# Patient Record
Sex: FEMALE | Race: WHITE | Employment: OTHER | URBAN - METROPOLITAN AREA
[De-identification: names, ages, dates, MRNs, and addresses within clinical notes are randomized per-mention and may not be internally consistent; named-entity substitution may affect disease eponyms.]

---

## 2017-03-20 ENCOUNTER — OFFICE VISIT (OUTPATIENT)
Dept: CARDIOLOGY CLINIC | Age: 77
End: 2017-03-20

## 2017-03-20 VITALS
SYSTOLIC BLOOD PRESSURE: 140 MMHG | DIASTOLIC BLOOD PRESSURE: 82 MMHG | HEIGHT: 62 IN | OXYGEN SATURATION: 96 % | WEIGHT: 162 LBS | BODY MASS INDEX: 29.81 KG/M2 | HEART RATE: 79 BPM

## 2017-03-20 DIAGNOSIS — I49.5 TACHYCARDIA-BRADYCARDIA SYNDROME (HCC): ICD-10-CM

## 2017-03-20 DIAGNOSIS — I48.19 PERSISTENT ATRIAL FIBRILLATION (HCC): Primary | ICD-10-CM

## 2017-03-20 DIAGNOSIS — Z95.0 PACEMAKER: ICD-10-CM

## 2017-03-20 DIAGNOSIS — I11.9 BENIGN HYPERTENSIVE HEART DISEASE WITHOUT HEART FAILURE: ICD-10-CM

## 2017-03-20 DIAGNOSIS — R00.2 PALPITATIONS: ICD-10-CM

## 2017-03-20 DIAGNOSIS — I34.1 MITRAL VALVE PROLAPSE SYNDROME: ICD-10-CM

## 2017-03-20 DIAGNOSIS — I25.10 ATHEROSCLEROSIS OF NATIVE CORONARY ARTERY OF NATIVE HEART WITHOUT ANGINA PECTORIS: ICD-10-CM

## 2017-03-20 DIAGNOSIS — H83.2X9 VESTIBULAR DYSFUNCTION, UNSPECIFIED LATERALITY: ICD-10-CM

## 2017-03-20 RX ORDER — LOSARTAN POTASSIUM AND HYDROCHLOROTHIAZIDE 12.5; 5 MG/1; MG/1
TABLET ORAL
Refills: 1 | COMMUNITY
Start: 2017-03-13 | End: 2018-08-07 | Stop reason: ALTCHOICE

## 2017-03-20 NOTE — PROGRESS NOTES
HISTORY OF PRESENT ILLNESS  Yazan Valentine is a 65 y.o. female. HPI  She is feeling reasonably well, but she has had some issues with fatigue and dizziness. Her  indicates that she sleeps more than usual.  She has had dizziness quite often  feeling as if her head spins. This occurs often when she is in bed with her head in a certain position. She is scheduled to see ENT for evaluation of vertigo and its treatment. She has had no syncope. She denies chest pain, dyspnea, orthopnea or PND. She does have occasional palpitations, but no prolonged palpitations. She has had no syncope. She has had no symptoms to indicate TIA or amaurosis fugax. She has a history of palpitations and mild coronary artery disease. She had a cardiac catheterization on September 20, 1995, which demonstrated:    1. Patent left main trunk. 2. Patent LAD. 3. Minor plaquing of the distal segment of the circumflex artery on a kink with less than 30% stenosis. 4. Patent nondominant right coronary artery with very mild diffuse disease.    5. Normal left ventricle. She has had a mid to late systolic murmur at the apex with no click, and the diagnosis of mitral valve prolapse and mild mitral regurgitation was established clinically as well as by echocardiographic findings. She had a positive treadmill stress test but negative thallium findings.    She has some spotting in her vision in the right eye, for which she has had multiple diagnostic procedures, including an MRI scan, and was told that she might have had an embolic stroke or a mild hemorrhage with the hypertension. She has been on Plavix since then. She had an adenosine Cardiolite myocardial perfusion scan on 02/22/05, at which time she had 2 mm ST depression on electrocardiogram with adenosine only, but the perfusion study was normal. The gated SPECT imaging revealed EF in the 70% range.  Because of the EKG abnormalities with the adenosine, the possibility of balanced ischemia with multi-vessel coronary disease was entertained and subsequently, she had a stress echocardiogram on 03/16/05, at which time she exercised 8 minutes and 41 seconds with no chest pain, but developed 2 mm of ST depression. There was no evidence of wall motion abnormalities in the presence of the ST depression and LV function remained normal. It was felt that the nuclear imaging and echocardiographic findings were normal with the EKG abnormalities. Therefore, there was no clear-cut clinical evidence of ischemia. She has been continued on medical treatment.    She had an echocardiogram in January 2008 which demonstrated normal LV function with EF in the 70-75% range. There was mild to moderate mitral regurgitation. She also had an Adenosine Cardiolite myocardial perfusion scan which demonstrated normal perfusion imaging and normal LV function. She had repeat stress nuclear cardiac imaging on September 20, 2010 which demonstrated normal perfusion and normal LV function.    She underwent stress nuclear cardiac imaging on 11/6/13 which demonstrated normal perfusion with no scaring or ischemia. She did have positive EKG changes without chest pain with exercise. The ejection fraction was estimated in the 80% range. She had followup stress nuclear cardiac imaging on 12/02/15, which demonstrated normal perfusion with no evidence of scarring or ischemia.  Ejection fraction was greater than 80% with a hyperdynamic ventricle.    Because of the frequent bradycardia with pauses on the Holter monitor on 05/19/16, we reduced the betablocker with no improvement.    She developed a racing heartbeat and was found to have atrial fibrillation when she was seen in the emergency room on 04/24/16. She was treated with atenolol 100 mg daily, which had to be reduced because of frequent pauses on the Holter monitor.  But, a subsequent Holter monitor continued to reveal frequent long pauses with baseline atrial fibrillation. She subsequently underwent permanent pacemaker implantation on 05/31/16 with a dual chamber MRI compatible [employer] pacemaker. Review of Systems   Constitutional: Positive for malaise/fatigue. Negative for weight loss. HENT: Negative for hearing loss. Eyes: Negative for blurred vision and double vision. Respiratory: Negative for shortness of breath. Cardiovascular: Positive for palpitations. Negative for chest pain, orthopnea, claudication, leg swelling and PND. Gastrointestinal: Negative for blood in stool, heartburn and melena. Genitourinary: Negative for dysuria, frequency, hematuria and urgency. Musculoskeletal: Negative for back pain and neck pain. Skin: Negative for itching and rash. Neurological: Positive for dizziness. Negative for loss of consciousness, weakness and headaches. Psychiatric/Behavioral: Negative for depression and memory loss. Physical Exam   Constitutional: She is oriented to person, place, and time. She appears well-developed and well-nourished. HENT:   Head: Normocephalic and atraumatic. Eyes: Conjunctivae are normal. Pupils are equal, round, and reactive to light. Neck: Normal range of motion. Neck supple. No JVD present. Cardiovascular: Normal rate, regular rhythm, S1 normal and S2 normal.   No extrasystoles are present. Exam reveals no gallop. Murmur heard. High-pitched blowing early systolic murmur is present  at the apex  Pulses:       Carotid pulses are 3+ on the right side, and 3+ on the left side. Pulmonary/Chest: Effort normal. She has no rales. Abdominal: Soft. There is no tenderness. Musculoskeletal: She exhibits no edema. Neurological: She is alert and oriented to person, place, and time. No cranial nerve deficit. Skin: Skin is warm and dry. Psychiatric: She has a normal mood and affect.  Her behavior is normal.     Visit Vitals    /82    Pulse 79    Ht 5' 2\" (1.575 m)    Wt 73.5 kg (162 lb)  SpO2 96%    BMI 29.63 kg/m2       Past Medical History:   Diagnosis Date    History of echocardiogram 01/02/2008    EF 70-75%. Mild-mod MR. Mod TR. PASP 40 mmHg.  History of myocardial perfusion scan 12/02/2015    Low risk. No ischemia or prior infarction. EF >80%. No chg from study of 11/7/13.  Hypercholesterolemia     Hypertension     Mild coronary artery disease 09/20/95    Cath: Minor plaquing of the distal segment of the circumflex artery on a kink with less than 30% stenosis    Mitral valve prolapse syndrome     with mid to late systolic murmur with no click    Palpitations     S/P cardiac cath 09/20/1995    dCx 30%. Otherwise patent coronaries.  Stroke [facility])     possible small embolic       Social History     Social History    Marital status:      Spouse name: N/A    Number of children: N/A    Years of education: N/A     Occupational History    Not on file.      Social History Main Topics    Smoking status: Former Smoker     Packs/day: 1.00     Years: 13.00     Quit date: 5/10/1969    Smokeless tobacco: Never Used    Alcohol use No    Drug use: No    Sexual activity: Not on file     Other Topics Concern    Not on file     Social History Narrative       Family History   Problem Relation Age of Onset    Diabetes Mother     Hypertension Mother     High Cholesterol Mother     Stroke Mother     Cancer Father     Hypertension Brother     High Cholesterol Brother     Breast Cancer Maternal Aunt      x2, age 36 and 72       Past Surgical History:   Procedure Laterality Date    HX ACL RECONSTRUCTION      left    HX CATARACT REMOVAL      bilateral    HX TOAN AND BSO      HX TONSILLECTOMY      JUNG BIOPSY BREAST STEREOTACTIC      left breast x 2 benign       Current Outpatient Prescriptions   Medication Sig Dispense Refill    losartan-hydroCHLOROthiazide (HYZAAR) 50-12.5 mg per tablet TK 1 T PO ONCE A DAY  1    DOCOSAHEXANOIC ACID/EPA (FISH OIL PO) Take  by mouth three (3) times daily.  ELIQUIS 5 mg tablet TAKE 1 TABLET BY MOUTH TWICE DAILY 60 Tab 8    ASCORBATE CALCIUM (VITAMIN C PO) Take  by mouth.  cholecalciferol, VITAMIN D3, (VITAMIN D3) 5,000 unit tab tablet Take  by mouth daily.  carvedilol (COREG) 25 mg tablet Take  by mouth. Take 0.5 tablet in the morning and 1 tablet in the evening      MAGNESIUM CHLORIDE (SLOW-MAG PO) Take  by mouth two (2) times a day.  pioglitazone (ACTOS) 15 mg tablet Take 7.5 mg by mouth daily.  PREDNISONE Take 5 mg by mouth daily.  glipiZIDE (GLUCOTROL) 5 mg tablet Take 5 mg by mouth two (2) times a day.  levothyroxine (SYNTHROID) 75 mcg tablet Take  by mouth Daily (before breakfast).  loteprednol etabonate (LOTEMAX) 0.5 % ophthalmic suspension Administer 1 Drop to both eyes daily.  cycloSPORINE (RESTASIS) 0.05 % ophthalmic emulsion Administer 1 Drop to both eyes two (2) times a day.  simvastatin (ZOCOR) 20 mg tablet Take 20 mg by mouth nightly.  potassium chloride (KLOR-CON M10) 10 mEq tablet Take 10 mEq by mouth two (2) times a day.  oxyCODONE-acetaminophen (PERCOCET) 5-325 mg per tablet Take 1 Tab by mouth every four (4) hours as needed. Max Daily Amount: 6 Tabs. 20 Tab 0       EKG: unchanged from previous tracings, atrial fibrillation, rate controlled, frequent pacer activities  . ASSESSMENT and PLAN  Encounter Diagnoses   Name Primary?  Persistent atrial fibrillation (HCC) Yes    Palpitations     Mitral valve prolapse syndrome with late systolic murmur.  Mild coronary artery disease, normal LV function.  Tachycardia-bradycardia syndrome ([address]     Pacemaker     Hypertension     Vestibular dysfunction, unspecified laterality    She has been doing reasonably well from a cardiac standpoint. She has had very little palpitations from the atrial fibrillation, which has been stable with good rate control.   She has had some conjunctival hemorrhage from the Eliquis, but not severe enough to discontinue the medication. The patient was reassured. She has had no symptoms to indicate angina. Her blood pressure has been under control. Her dizziness seems to be related to vertigo and for which she is scheduled to see ENT for evaluation and treatment. The patient was advised that vertigo is a benign situation.

## 2017-03-20 NOTE — MR AVS SNAPSHOT
Visit Information Date & Time Provider Department Dept. Phone Encounter #  
 3/20/2017  1:00 PM Jazmine Newell MD [facility] [place] [facility] [phone] [id number] Upcoming Health Maintenance Date Due DTaP/Tdap/Td series (1 - Tdap) 1/4/1960 ZOSTER VACCINE AGE 60> 1/4/1999 GLAUCOMA SCREENING Q2Y 1/4/2004 OSTEOPOROSIS SCREENING (DEXA) 1/4/2004 Pneumococcal 65+ Low/Medium Risk (1 of 2 - PCV13) 1/4/2004 MEDICARE YEARLY EXAM 1/4/2004 COLONOSCOPY 7/15/2015 INFLUENZA AGE 9 TO ADULT 8/1/2016 Allergies as of 3/20/2017  Review Complete On: 3/20/2017 By: Jazmine Newell MD  
  
 Severity Noted Reaction Type Reactions Tagamet [Cimetidine]  05/10/2011    Unknown (comments) Current Immunizations  Never Reviewed No immunizations on file. Not reviewed this visit You Were Diagnosed With   
  
 Codes Comments Persistent atrial fibrillation (HCC)    -  Primary ICD-10-CM: I48.1 ICD-9-CM: 427.31 Palpitations     ICD-10-CM: R00.2 ICD-9-CM: 785.1 Mitral valve prolapse syndrome     ICD-10-CM: I34.1 ICD-9-CM: 424.0 Atherosclerosis of native coronary artery of native heart without angina pectoris     ICD-10-CM: I25.10 ICD-9-CM: 414.01 Polymyalgia rheumatica (HCC)     ICD-10-CM: M35.3 ICD-9-CM: 095 Tachycardia-bradycardia syndrome ([facility] [address]     ICD-10-CM: I49.5 ICD-9-CM: 427.81 Pacemaker     ICD-10-CM: Z95.0 ICD-9-CM: V45.01 Vitals BP Pulse Height(growth percentile) Weight(growth percentile) SpO2 BMI  
 140/82 79 5' 2\" (1.575 m) 162 lb (73.5 kg) 96% 29.63 kg/m2 OB Status Smoking Status Postmenopausal Former Smoker Vitals History BMI and BSA Data Body Mass Index Body Surface Area  
 29.63 kg/m 2 1.79 m 2 Preferred Pharmacy Pharmacy Name Phone [facility] [address] [place] [address] AT [place] [phone] Your Updated Medication List  
  
   
This list is accurate as of: 3/20/17  1:51 PM.  Always use your most recent med list.  
  
  
  
  
 carvedilol 25 mg tablet Commonly known as:  Jim Been Take  by mouth. Take 0.5 tablet in the morning and 1 tablet in the evening  
  
 cholecalciferol (VITAMIN D3) 5,000 unit Tab tablet Commonly known as:  VITAMIN D3 Take  by mouth daily. ELIQUIS 5 mg tablet Generic drug:  apixaban TAKE 1 TABLET BY MOUTH TWICE DAILY FISH OIL PO Take  by mouth three (3) times daily. glipiZIDE 5 mg tablet Commonly known as:  Vivian Dayhoff Take 5 mg by mouth two (2) times a day. KLOR-CON M10 10 mEq tablet Generic drug:  potassium chloride Take 10 mEq by mouth two (2) times a day. losartan-hydroCHLOROthiazide 50-12.5 mg per tablet Commonly known as:  HYZAAR TK 1 T PO ONCE A DAY  
  
 LOTEMAX 0.5 % ophthalmic suspension Generic drug:  loteprednol etabonate Administer 1 Drop to both eyes daily. oxyCODONE-acetaminophen 5-325 mg per tablet Commonly known as:  PERCOCET Take 1 Tab by mouth every four (4) hours as needed. Max Daily Amount: 6 Tabs. pioglitazone 15 mg tablet Commonly known as:  ACTOS Take 7.5 mg by mouth daily. PREDNISONE Take 5 mg by mouth daily. RESTASIS 0.05 % ophthalmic emulsion Generic drug:  cycloSPORINE Administer 1 Drop to both eyes two (2) times a day. SLOW-MAG PO Take  by mouth two (2) times a day. SYNTHROID 75 mcg tablet Generic drug:  levothyroxine Take  by mouth Daily (before breakfast). VITAMIN C PO Take  by mouth. ZOCOR 20 mg tablet Generic drug:  simvastatin Take 20 mg by mouth nightly. We Performed the Following AMB POC EKG ROUTINE W/ 12 LEADS, INTER & REP [52455 CPT(R)] Please provide this summary of care documentation to your next provider. Your primary care clinician is listed as [facility].  If you have any questions after today's visit, please call [phone].

## 2017-03-20 NOTE — PROGRESS NOTES
1. Have you been to the ER, urgent care clinic since your last visit? Hospitalized since your last visit? no  2. Have you seen or consulted any other health care providers outside of the [address] [place] [address] since your last visit?   Include any pap smears or colon screening no

## 2017-04-18 ENCOUNTER — HOSPITAL ENCOUNTER (OUTPATIENT)
Dept: MAMMOGRAPHY | Age: 77
Discharge: HOME | End: 2017-04-18
Attending: INTERNAL MEDICINE
Payer: MEDICARE

## 2017-04-18 DIAGNOSIS — Z12.31 VISIT FOR SCREENING MAMMOGRAM: ICD-10-CM

## 2017-04-18 PROCEDURE — 77067 SCR MAMMO BI INCL CAD: CPT

## 2017-04-19 DIAGNOSIS — I49.5 TACHYCARDIA-BRADYCARDIA SYNDROME (HCC): ICD-10-CM

## 2017-04-19 DIAGNOSIS — I48.19 PERSISTENT ATRIAL FIBRILLATION (HCC): ICD-10-CM

## 2017-04-27 RX ORDER — CARVEDILOL 25 MG/1
TABLET ORAL
Qty: 60 TAB | Refills: 6 | Status: SHIPPED | OUTPATIENT
Start: 2017-04-27 | End: 2018-10-31 | Stop reason: SDUPTHER

## 2017-05-05 ENCOUNTER — TELEPHONE (OUTPATIENT)
Dept: CARDIOLOGY CLINIC | Age: 77
End: 2017-05-05

## 2017-05-05 NOTE — TELEPHONE ENCOUNTER
Shannon from Dr. James Callahan, Neurology Specialist called today wanted to know if ok for patient to have MRI Brain patient has dual chamber MRI compatible [employer] pacemaker. Mario Parks     Per Gordon Lott RN   Ok to have MRI need to know when/where patient is scheduled to have MRI done so the Rep from [employer] can be there to reprogram patient's device for the MRI. Shannon aware states she will call our office back with date and time of MRI once it is scheduled. Our office will need to notify [employer] Rep with that info.   Mario Parks

## 2017-05-09 NOTE — TELEPHONE ENCOUNTER
Patient states that she was told by the neurologist office that her device would be turned off during her MRI and wanted to know the risk that imposed. Patient was advised that the device is never turned off for any reason and that a [employer] Rep will be there to reprogram the device as an extra safety measure. Patient verbalized understanding to instructions and states she will call back to inform us of the Date and time of her MRI.

## 2017-05-22 NOTE — TELEPHONE ENCOUNTER
Got informed via phone that Mrs Anna Brock will have MRI at [facility] on [address] at 1230.

## 2017-07-21 ENCOUNTER — HOSPITAL ENCOUNTER (OUTPATIENT)
Dept: ULTRASOUND IMAGING | Age: 77
Discharge: HOME | End: 2017-07-21
Attending: INTERNAL MEDICINE
Payer: MEDICARE

## 2017-07-21 DIAGNOSIS — R10.9 ABDOMINAL PAIN, UNSPECIFIED SITE: ICD-10-CM

## 2017-07-21 PROCEDURE — 76856 US EXAM PELVIC COMPLETE: CPT

## 2017-08-09 ENCOUNTER — OFFICE VISIT (OUTPATIENT)
Dept: CARDIOLOGY CLINIC | Age: 77
End: 2017-08-09

## 2017-08-09 DIAGNOSIS — Z95.0 PACEMAKER: ICD-10-CM

## 2017-08-09 DIAGNOSIS — I48.19 PERSISTENT ATRIAL FIBRILLATION (HCC): ICD-10-CM

## 2017-08-09 DIAGNOSIS — I49.5 TACHYCARDIA-BRADYCARDIA SYNDROME (HCC): Primary | ICD-10-CM

## 2017-08-09 NOTE — PROGRESS NOTES
I have personally seen and evaluated the device findings. Interrogation reviewed and I agree with assessment.     Divina Dockery

## 2017-10-05 ENCOUNTER — OFFICE VISIT (OUTPATIENT)
Dept: CARDIOLOGY CLINIC | Age: 77
End: 2017-10-05

## 2017-10-05 VITALS
HEART RATE: 69 BPM | WEIGHT: 164 LBS | HEIGHT: 62 IN | OXYGEN SATURATION: 96 % | BODY MASS INDEX: 30.18 KG/M2 | DIASTOLIC BLOOD PRESSURE: 82 MMHG | SYSTOLIC BLOOD PRESSURE: 130 MMHG

## 2017-10-05 DIAGNOSIS — I48.20 CHRONIC ATRIAL FIBRILLATION (HCC): Primary | ICD-10-CM

## 2017-10-05 DIAGNOSIS — I25.10 ATHEROSCLEROSIS OF NATIVE CORONARY ARTERY OF NATIVE HEART WITHOUT ANGINA PECTORIS: ICD-10-CM

## 2017-10-05 DIAGNOSIS — R00.2 PALPITATIONS: ICD-10-CM

## 2017-10-05 DIAGNOSIS — I11.9 BENIGN HYPERTENSIVE HEART DISEASE WITHOUT HEART FAILURE: ICD-10-CM

## 2017-10-05 DIAGNOSIS — I34.1 MITRAL VALVE PROLAPSE SYNDROME: ICD-10-CM

## 2017-10-05 DIAGNOSIS — Z95.0 PACEMAKER: ICD-10-CM

## 2017-10-05 DIAGNOSIS — I49.5 TACHYCARDIA-BRADYCARDIA SYNDROME (HCC): ICD-10-CM

## 2017-10-05 DIAGNOSIS — H83.2X9 VESTIBULAR DYSFUNCTION, UNSPECIFIED LATERALITY: ICD-10-CM

## 2017-10-05 NOTE — PROGRESS NOTES
HISTORY OF PRESENT ILLNESS  Angelika Ojeda is a 65 y.o. female. HPI  She has been feeling much better. Her vertigo has resolved with the head manipulation by the neurologist.  She has had very little palpitations. She denies syncope. She has had no symptoms to indicate TIA or amaurosis fugax. She has had no chest pain, dyspnea, orthopnea or PND. She has a history of palpitations and mild coronary artery disease. She had a cardiac catheterization on September 20, 1995, which demonstrated:    1. Patent left main trunk. 2. Patent LAD. 3. Minor plaquing of the distal segment of the circumflex artery on a kink with less than 30% stenosis. 4. Patent nondominant right coronary artery with very mild diffuse disease.    5. Normal left ventricle. She has had a mid to late systolic murmur at the apex with no click, and the diagnosis of mitral valve prolapse and mild mitral regurgitation was established clinically as well as by echocardiographic findings. She had a positive treadmill stress test but negative thallium findings.    She has some spotting in her vision in the right eye, for which she has had multiple diagnostic procedures, including an MRI scan, and was told that she might have had an embolic stroke or a mild hemorrhage with the hypertension. She has been on Plavix since then. She had an adenosine Cardiolite myocardial perfusion scan on 02/22/05, at which time she had 2 mm ST depression on electrocardiogram with adenosine only, but the perfusion study was normal. The gated SPECT imaging revealed EF in the 70% range. Because of the EKG abnormalities with the adenosine, the possibility of balanced ischemia with multi-vessel coronary disease was entertained and subsequently, she had a stress echocardiogram on 03/16/05, at which time she exercised 8 minutes and 41 seconds with no chest pain, but developed 2 mm of ST depression.  There was no evidence of wall motion abnormalities in the presence of the ST depression and LV function remained normal. It was felt that the nuclear imaging and echocardiographic findings were normal with the EKG abnormalities. Therefore, there was no clear-cut clinical evidence of ischemia. She has been continued on medical treatment.    She had an echocardiogram in January 2008 which demonstrated normal LV function with EF in the 70-75% range. There was mild to moderate mitral regurgitation. She also had an Adenosine Cardiolite myocardial perfusion scan which demonstrated normal perfusion imaging and normal LV function. She had repeat stress nuclear cardiac imaging on September 20, 2010 which demonstrated normal perfusion and normal LV function.    She underwent stress nuclear cardiac imaging on 11/6/13 which demonstrated normal perfusion with no scaring or ischemia. She did have positive EKG changes without chest pain with exercise. The ejection fraction was estimated in the 80% range. She had followup stress nuclear cardiac imaging on 12/02/15, which demonstrated normal perfusion with no evidence of scarring or ischemia.  Ejection fraction was greater than 80% with a hyperdynamic ventricle.    Because of the frequent bradycardia with pauses on the Holter monitor on 05/19/16, we reduced the betablocker with no improvement.    She developed a racing heartbeat and was found to have atrial fibrillation when she was seen in the emergency room on 04/24/16. She was treated with atenolol 100 mg daily, which had to be reduced because of frequent pauses on the Holter monitor. But, a subsequent Holter monitor continued to reveal frequent long pauses with baseline atrial fibrillation. She subsequently underwent permanent pacemaker implantation on 05/31/16 with a dual chamber MRI compatible Medtronic pacemaker. Review of Systems   Constitutional: Positive for malaise/fatigue. Negative for weight loss. HENT: Negative for hearing loss.     Eyes: Negative for blurred vision and double vision. Respiratory: Negative for shortness of breath. Cardiovascular: Positive for palpitations. Negative for chest pain, orthopnea, claudication, leg swelling and PND. Gastrointestinal: Negative for blood in stool, heartburn and melena. Genitourinary: Negative for dysuria, frequency, hematuria and urgency. Musculoskeletal: Negative for back pain and joint pain. Skin: Negative for itching and rash. Neurological: Negative for dizziness, loss of consciousness and weakness. Psychiatric/Behavioral: Negative for depression and memory loss. Physical Exam   Constitutional: She is oriented to person, place, and time. She appears well-developed and well-nourished. HENT:   Head: Normocephalic and atraumatic. Eyes: Conjunctivae are normal. Pupils are equal, round, and reactive to light. Neck: Normal range of motion. Neck supple. No JVD present. Cardiovascular: Normal rate, regular rhythm, S1 normal and S2 normal.   No extrasystoles are present. PMI is not displaced. Exam reveals no gallop and no friction rub. Murmur heard. High-pitched blowing early systolic murmur is present with a grade of 1/6  at the apex  Pulses:       Carotid pulses are 3+ on the right side, and 3+ on the left side. Pulmonary/Chest: Effort normal. She has no rales. Abdominal: Soft. There is no tenderness. Musculoskeletal: She exhibits no edema. Neurological: She is alert and oriented to person, place, and time. No cranial nerve deficit. Skin: Skin is warm and dry. Psychiatric: She has a normal mood and affect. Her behavior is normal.     Visit Vitals    /82    Pulse 69    Ht 5' 2\" (1.575 m)    Wt 74.4 kg (164 lb)    SpO2 96%    BMI 30 kg/m2       Past Medical History:   Diagnosis Date    History of echocardiogram 01/02/2008    EF 70-75%. Mild-mod MR. Mod TR. PASP 40 mmHg.  History of myocardial perfusion scan 12/02/2015    Low risk. No ischemia or prior infarction. EF >80%. No chg from study of 11/7/13.  Hypercholesterolemia     Hypertension     Mild coronary artery disease 09/20/95    Cath: Minor plaquing of the distal segment of the circumflex artery on a kink with less than 30% stenosis    Mitral valve prolapse syndrome     with mid to late systolic murmur with no click    Palpitations     S/P cardiac cath 09/20/1995    dCx 30%. Otherwise patent coronaries.  Stroke [facility])     possible small embolic       Social History     Social History    Marital status:      Spouse name: N/A    Number of children: N/A    Years of education: N/A     Occupational History    Not on file. Social History Main Topics    Smoking status: Former Smoker     Packs/day: 1.00     Years: 13.00     Quit date: 5/10/1969    Smokeless tobacco: Never Used    Alcohol use No    Drug use: No    Sexual activity: Not on file     Other Topics Concern    Not on file     Social History Narrative       Family History   Problem Relation Age of Onset    Diabetes Mother     Hypertension Mother     High Cholesterol Mother     Stroke Mother     Cancer Father     Hypertension Brother     High Cholesterol Brother     Breast Cancer Maternal Aunt      x2, age 36 and 72       Past Surgical History:   Procedure Laterality Date    HX ACL RECONSTRUCTION      left    HX CATARACT REMOVAL      bilateral    HX TOAN AND BSO      HX TONSILLECTOMY      JUNG BIOPSY BREAST STEREOTACTIC      left breast x 2 benign       Current Outpatient Prescriptions   Medication Sig Dispense Refill    apixaban (ELIQUIS) 5 mg tablet TAKE 1 TABLET BY MOUTH TWICE DAILY 90 Tab 3    carvedilol (COREG) 25 mg tablet TAKE 1 TABLET BY MOUTH TWICE DAILY WITH MEALS 60 Tab 6    losartan-hydroCHLOROthiazide (HYZAAR) 50-12.5 mg per tablet TK 1 T PO ONCE A DAY  1    DOCOSAHEXANOIC ACID/EPA (FISH OIL PO) Take  by mouth three (3) times daily.  ASCORBATE CALCIUM (VITAMIN C PO) Take  by mouth.       cholecalciferol, VITAMIN D3, (VITAMIN D3) 5,000 unit tab tablet Take  by mouth daily.  MAGNESIUM CHLORIDE (SLOW-MAG PO) Take  by mouth two (2) times a day.  oxyCODONE-acetaminophen (PERCOCET) 5-325 mg per tablet Take 1 Tab by mouth every four (4) hours as needed. Max Daily Amount: 6 Tabs. 20 Tab 0    pioglitazone (ACTOS) 15 mg tablet Take 7.5 mg by mouth daily.  PREDNISONE Take 5 mg by mouth daily.  glipiZIDE (GLUCOTROL) 5 mg tablet Take 5 mg by mouth two (2) times a day.  levothyroxine (SYNTHROID) 75 mcg tablet Take  by mouth Daily (before breakfast).  loteprednol etabonate (LOTEMAX) 0.5 % ophthalmic suspension Administer 1 Drop to both eyes daily.  cycloSPORINE (RESTASIS) 0.05 % ophthalmic emulsion Administer 1 Drop to both eyes two (2) times a day.  simvastatin (ZOCOR) 20 mg tablet Take 20 mg by mouth nightly.  potassium chloride (KLOR-CON M10) 10 mEq tablet Take 10 mEq by mouth two (2) times a day. EKG: unchanged from previous tracings, atrial fibrillation, rate controlled, frequent pacer activities  . ASSESSMENT and PLAN  Encounter Diagnoses   Name Primary?  Chronic atrial fibrillation (HCC) Yes    Palpitations     Mitral valve prolapse syndrome with late systolic murmur.  Mild coronary artery disease, normal LV function.  Tachycardia-bradycardia syndrome ([address]     Pacemaker     Hypertension     Vestibular dysfunction, unspecified laterality    She has been doing very well. Her atrial fibrillation has been stable with good rate control and she has been essentially asymptomatic from the atrial fibrillation. She has had no symptoms to indicate angina or cardiac decompensation. Her vertigo seems to have resolved with the head manipulation by the neurologist.  For now, she will be continued on the current medical regimen.

## 2017-10-05 NOTE — MR AVS SNAPSHOT
Visit Information Date & Time Provider Department Dept. Phone Encounter #  
 10/5/2017  1:00 PM Marilyn Burton MD [facility] [address] [id number] Your Appointments 11/16/2017 11:30 AM  
PROCEDURE with Pacer Eliel Csi [facility] ([address]) Appt Note: 1 year check [place] [address] [address] [address] [phone] [address] [place] [address] [address] Upcoming Health Maintenance Date Due DTaP/Tdap/Td series (1 - Tdap) 1/4/1960 ZOSTER VACCINE AGE 60> 11/4/1998 GLAUCOMA SCREENING Q2Y 1/4/2004 OSTEOPOROSIS SCREENING (DEXA) 1/4/2004 Pneumococcal 65+ Low/Medium Risk (1 of 2 - PCV13) 1/4/2004 MEDICARE YEARLY EXAM 1/4/2004 COLONOSCOPY 7/15/2015 INFLUENZA AGE 9 TO ADULT 8/1/2017 Allergies as of 10/5/2017  Review Complete On: 10/5/2017 By: Marilyn Burton MD  
  
 Severity Noted Reaction Type Reactions Tagamet [Cimetidine]  05/10/2011    Unknown (comments) Current Immunizations  Never Reviewed No immunizations on file. Not reviewed this visit You Were Diagnosed With   
  
 Codes Comments Persistent atrial fibrillation (HCC)    -  Primary ICD-10-CM: I48.1 ICD-9-CM: 427.31 Palpitations     ICD-10-CM: R00.2 ICD-9-CM: 785.1 Mitral valve prolapse syndrome     ICD-10-CM: I34.1 ICD-9-CM: 424.0 Atherosclerosis of native coronary artery of native heart without angina pectoris     ICD-10-CM: I25.10 ICD-9-CM: 414.01 Tachycardia-bradycardia syndrome ([facility] [address]     ICD-10-CM: I49.5 ICD-9-CM: 427.81 Pacemaker     ICD-10-CM: Z95.0 ICD-9-CM: V45.01 Benign hypertensive heart disease without heart failure     ICD-10-CM: I11.9 ICD-9-CM: 402.10 Vestibular dysfunction, unspecified laterality     ICD-10-CM: A31.9R1 ICD-9-CM: 386.50 Vitals BP Pulse Height(growth percentile) Weight(growth percentile) SpO2 BMI  
 130/82 69 5' 2\" (1.575 m) 164 lb (74.4 kg) 96% 30 kg/m2 OB Status Smoking Status Postmenopausal Former Smoker Vitals History BMI and BSA Data Body Mass Index Body Surface Area  
 30 kg/m 2 1.8 m 2 Preferred Pharmacy Pharmacy Name Phone [facility] [address] [place] [address] AT [facility] [phone] Your Updated Medication List  
  
   
This list is accurate as of: 10/5/17  1:37 PM.  Always use your most recent med list.  
  
  
  
  
 apixaban 5 mg tablet Commonly known as:  ELIQUIS  
TAKE 1 TABLET BY MOUTH TWICE DAILY  
  
 carvedilol 25 mg tablet Commonly known as:  COREG  
TAKE 1 TABLET BY MOUTH TWICE DAILY WITH MEALS  
  
 cholecalciferol (VITAMIN D3) 5,000 unit Tab tablet Commonly known as:  VITAMIN D3 Take  by mouth daily. FISH OIL PO Take  by mouth three (3) times daily. glipiZIDE 5 mg tablet Commonly known as:  Janith Ket Take 5 mg by mouth two (2) times a day. KLOR-CON M10 10 mEq tablet Generic drug:  potassium chloride Take 10 mEq by mouth two (2) times a day. losartan-hydroCHLOROthiazide 50-12.5 mg per tablet Commonly known as:  HYZAAR TK 1 T PO ONCE A DAY  
  
 LOTEMAX 0.5 % ophthalmic suspension Generic drug:  loteprednol etabonate Administer 1 Drop to both eyes daily. oxyCODONE-acetaminophen 5-325 mg per tablet Commonly known as:  PERCOCET Take 1 Tab by mouth every four (4) hours as needed. Max Daily Amount: 6 Tabs. pioglitazone 15 mg tablet Commonly known as:  ACTOS Take 7.5 mg by mouth daily. PREDNISONE Take 5 mg by mouth daily. RESTASIS 0.05 % ophthalmic emulsion Generic drug:  cycloSPORINE Administer 1 Drop to both eyes two (2) times a day. SLOW-MAG PO Take  by mouth two (2) times a day. SYNTHROID 75 mcg tablet Generic drug:  levothyroxine Take  by mouth Daily (before breakfast). VITAMIN C PO Take  by mouth. ZOCOR 20 mg tablet Generic drug:  simvastatin Take 20 mg by mouth nightly. We Performed the Following AMB POC EKG ROUTINE W/ 12 LEADS, INTER & REP [31158 CPT(R)] Introducing [facility] SERVICES! Dear Phani Najera: Thank you for requesting a [employer] account. Our records indicate that you have previously registered for a Array[employer] account but its currently inactive. Please call our Array[employer] support line at [phone]. Additional Information If you have questions, please visit the Frequently Asked Questions section of the Arrayit website at [email or web address] Remember, Arrayit is NOT to be used for urgent needs. For medical emergencies, dial 911. Now available from your iPhone and Android! Please provide this summary of care documentation to your next provider. Your primary care clinician is listed as [facility]. If you have any questions after today's visit, please call [phone].

## 2017-10-05 NOTE — PROGRESS NOTES
1. Have you been to the ER, urgent care clinic since your last visit? Hospitalized since your last visit? no  2. Have you seen or consulted any other health care providers outside of the [address] [place] [address] since your last visit? Include any pap smears or colon screening.  no

## 2017-11-16 ENCOUNTER — OFFICE VISIT (OUTPATIENT)
Dept: CARDIOLOGY CLINIC | Age: 77
End: 2017-11-16

## 2017-11-16 DIAGNOSIS — I49.5 TACHYCARDIA-BRADYCARDIA SYNDROME (HCC): Primary | ICD-10-CM

## 2017-11-16 DIAGNOSIS — Z95.0 PACEMAKER: ICD-10-CM

## 2017-11-16 NOTE — PROGRESS NOTES
I have personally seen and evaluated the device findings. Interrogation reviewed and I agree with assessment.     Erika Lung

## 2018-02-21 ENCOUNTER — OFFICE VISIT (OUTPATIENT)
Dept: CARDIOLOGY CLINIC | Age: 78
End: 2018-02-21

## 2018-02-21 DIAGNOSIS — Z95.0 PACEMAKER: ICD-10-CM

## 2018-02-21 DIAGNOSIS — I49.5 TACHYCARDIA-BRADYCARDIA SYNDROME (HCC): Primary | ICD-10-CM

## 2018-02-22 NOTE — PROGRESS NOTES
I have personally seen and evaluated the device findings. Interrogation reviewed and I agree with assessment.     Pablo Rosario

## 2018-04-09 ENCOUNTER — OFFICE VISIT (OUTPATIENT)
Dept: CARDIOLOGY CLINIC | Age: 78
End: 2018-04-09

## 2018-04-09 VITALS
DIASTOLIC BLOOD PRESSURE: 80 MMHG | HEIGHT: 62 IN | WEIGHT: 163 LBS | OXYGEN SATURATION: 97 % | BODY MASS INDEX: 30 KG/M2 | HEART RATE: 69 BPM | SYSTOLIC BLOOD PRESSURE: 150 MMHG

## 2018-04-09 DIAGNOSIS — I34.1 MITRAL VALVE PROLAPSE SYNDROME: ICD-10-CM

## 2018-04-09 DIAGNOSIS — H83.2X9 VESTIBULAR DYSFUNCTION, UNSPECIFIED LATERALITY: ICD-10-CM

## 2018-04-09 DIAGNOSIS — I25.10 ATHEROSCLEROSIS OF NATIVE CORONARY ARTERY OF NATIVE HEART WITHOUT ANGINA PECTORIS: ICD-10-CM

## 2018-04-09 DIAGNOSIS — I48.20 CHRONIC ATRIAL FIBRILLATION (HCC): Primary | ICD-10-CM

## 2018-04-09 DIAGNOSIS — R00.2 PALPITATIONS: ICD-10-CM

## 2018-04-09 DIAGNOSIS — Z95.0 PACEMAKER: ICD-10-CM

## 2018-04-09 DIAGNOSIS — I49.5 TACHYCARDIA-BRADYCARDIA SYNDROME (HCC): ICD-10-CM

## 2018-04-09 DIAGNOSIS — I11.9 BENIGN HYPERTENSIVE HEART DISEASE WITHOUT HEART FAILURE: ICD-10-CM

## 2018-04-09 RX ORDER — SPIRONOLACTONE 25 MG/1
25 TABLET ORAL DAILY
Qty: 30 TAB | Refills: 6 | Status: SHIPPED | OUTPATIENT
Start: 2018-04-09 | End: 2018-10-20 | Stop reason: SDUPTHER

## 2018-04-09 NOTE — MR AVS SNAPSHOT
1992 [address] [address] [address] [address] [phone] Patient: Yoni Moss MRN: [id number] Visit Information Date & Time Provider Department Dept. Phone Encounter #  
 4/9/2018  1:40 PM Wilver Dunlap MD [facility] [address] [phone] Your Appointments 6/6/2018  1:40 PM  
CARELINK with Pacer Eliel Csi [facility] [address] ([facility]) Appt Note: Jacquie Awad 39 [id number] [address] [address] [phone] [address] [place] [address] [address] Upcoming Health Maintenance Date Due DTaP/Tdap/Td series (1 - Tdap) 1/4/1960 ZOSTER VACCINE AGE 60> 11/4/1998 GLAUCOMA SCREENING Q2Y 1/4/2004 Bone Densitometry (Dexa) Screening 1/4/2004 Pneumococcal 65+ Low/Medium Risk (1 of 2 - PCV13) 1/4/2004 COLONOSCOPY 7/15/2015 Influenza Age 5 to Adult 8/1/2017 MEDICARE YEARLY EXAM 3/14/2018 Allergies as of 4/9/2018  Review Complete On: 4/9/2018 By: Wilver Dunlap MD  
  
 Severity Noted Reaction Type Reactions Tagamet [Cimetidine]  05/10/2011    Unknown (comments) Current Immunizations  Never Reviewed No immunizations on file. Not reviewed this visit You Were Diagnosed With   
  
 Codes Comments Chronic atrial fibrillation (HCC)    -  Primary ICD-10-CM: E45.1 ICD-9-CM: 427.31 Palpitations     ICD-10-CM: R00.2 ICD-9-CM: 785.1 Mitral valve prolapse syndrome     ICD-10-CM: I34.1 ICD-9-CM: 424.0 Atherosclerosis of native coronary artery of native heart without angina pectoris     ICD-10-CM: I25.10 ICD-9-CM: 414.01 Tachycardia-bradycardia syndrome ([address]     ICD-10-CM: I49.5 ICD-9-CM: 427.81 Pacemaker     ICD-10-CM: Z95.0 ICD-9-CM: V45.01 Benign hypertensive heart disease without heart failure     ICD-10-CM: I11.9 ICD-9-CM: 402.10 Vestibular dysfunction, unspecified laterality     ICD-10-CM: Q42.0K9 ICD-9-CM: 386.50 Vitals BP Pulse Height(growth percentile) Weight(growth percentile) SpO2 BMI  
 150/80 69 5' 2\" (1.575 m) 163 lb (73.9 kg) 97% 29.81 kg/m2 OB Status Smoking Status Postmenopausal Former Smoker Vitals History BMI and BSA Data Body Mass Index Body Surface Area  
 29.81 kg/m 2 1.8 m 2 Preferred Pharmacy Pharmacy Name Phone [id number]- [place] [address] Maria M Weston [phone] Your Updated Medication List  
  
   
This list is accurate as of 4/9/18  2:51 PM.  Always use your most recent med list.  
  
  
  
  
 apixaban 5 mg tablet Commonly known as:  ELIQUIS  
TAKE 1 TABLET BY MOUTH TWICE DAILY  
  
 carvedilol 25 mg tablet Commonly known as:  COREG  
TAKE 1 TABLET BY MOUTH TWICE DAILY WITH MEALS  
  
 cholecalciferol (VITAMIN D3) 5,000 unit Tab tablet Commonly known as:  VITAMIN D3 Take  by mouth daily. FISH OIL PO Take  by mouth three (3) times daily. glipiZIDE 5 mg tablet Commonly known as:  Tre Half Take 5 mg by mouth two (2) times a day. losartan-hydroCHLOROthiazide 50-12.5 mg per tablet Commonly known as:  HYZAAR TK 1 T PO ONCE A DAY  
  
 LOTEMAX 0.5 % ophthalmic suspension Generic drug:  loteprednol etabonate Administer 1 Drop to both eyes daily. oxyCODONE-acetaminophen 5-325 mg per tablet Commonly known as:  PERCOCET Take 1 Tab by mouth every four (4) hours as needed. Max Daily Amount: 6 Tabs. pioglitazone 15 mg tablet Commonly known as:  ACTOS Take 7.5 mg by mouth daily. PREDNISONE Take 5 mg by mouth daily. RESTASIS 0.05 % ophthalmic emulsion Generic drug:  cycloSPORINE Administer 1 Drop to both eyes two (2) times a day. SLOW-MAG PO Take  by mouth two (2) times a day. SYNTHROID 75 mcg tablet Generic drug:  levothyroxine Take  by mouth Daily (before breakfast). VITAMIN C PO Take  by mouth. ZOCOR 20 mg tablet Generic drug:  simvastatin Take 20 mg by mouth nightly. We Performed the Following AMB POC EKG ROUTINE W/ 12 LEADS, INTER & REP [09091 CPT(R)] To-Do List   
 04/23/2018 Lab:  METABOLIC PANEL, BASIC Introducing [facility] & HEALTH SERVICES! Jen Ospina introduces [employer] patient portal. Now you can access parts of your medical record, email your doctor's office, and request medication refills online. 1. In your internet browser, go to [email or web address] 2. Click on the First Time User? Click Here link in the Sign In box. You will see the New Member Sign Up page. 3. Enter your [employer] Access Code exactly as it appears below. You will not need to use this code after youve completed the sign-up process. If you do not sign up before the expiration date, you must request a new code. · [employer] Access Code: [email or web address] Expires: 7/8/2018  1:38 PM 
 
4. Enter the last four digits of your Social Security Number (xxxx) and Date of Birth (mm/dd/yyyy) as indicated and click Submit. You will be taken to the next sign-up page. 5. Create a [employer] ID. This will be your [employer] login ID and cannot be changed, so think of one that is secure and easy to remember. 6. Create a [employer] password. You can change your password at any time. 7. Enter your Password Reset Question and Answer. This can be used at a later time if you forget your password. 8. Enter your e-mail address. You will receive e-mail notification when new information is available in [address] 9. Click Sign Up. You can now view and download portions of your medical record. 10. Click the Download Summary menu link to download a portable copy of your medical information.  
 
If you have questions, please visit the Frequently Asked Questions section of the [employer]. Remember, [employer] is NOT to be used for urgent needs. For medical emergencies, dial 911. Now available from your iPhone and Android! Please provide this summary of care documentation to your next provider. Your primary care clinician is listed as [facility]. If you have any questions after today's visit, please call [phone].

## 2018-04-09 NOTE — PROGRESS NOTES
HISTORY OF PRESENT ILLNESS  Joyceann Paget is a 77 y.o. female. HPI  She has been feeling reasonably well. She continues with the mild dyspnea on exertion with overexertion but not with other daily activities. She has had some palpitations particularly at night but no prolonged palpitations. She has had no syncope but continues with the mild dizziness with vertigo. She denies any symptoms to indicate TIA or amaurosis fugax. She denies any exertional chest discomfort. She has a history of palpitations and mild coronary artery disease. She had a cardiac catheterization on September 20, 1995, which demonstrated:    1. Patent left main trunk. 2. Patent LAD. 3. Minor plaquing of the distal segment of the circumflex artery on a kink with less than 30% stenosis. 4. Patent nondominant right coronary artery with very mild diffuse disease.    5. Normal left ventricle. She has had a mid to late systolic murmur at the apex with no click, and the diagnosis of mitral valve prolapse and mild mitral regurgitation was established clinically as well as by echocardiographic findings. She had a positive treadmill stress test but negative thallium findings.    She has some spotting in her vision in the right eye, for which she has had multiple diagnostic procedures, including an MRI scan, and was told that she might have had an embolic stroke or a mild hemorrhage with the hypertension. She has been on Plavix since then. She had an adenosine Cardiolite myocardial perfusion scan on 02/22/05, at which time she had 2 mm ST depression on electrocardiogram with adenosine only, but the perfusion study was normal. The gated SPECT imaging revealed EF in the 70% range.  Because of the EKG abnormalities with the adenosine, the possibility of balanced ischemia with multi-vessel coronary disease was entertained and subsequently, she had a stress echocardiogram on 03/16/05, at which time she exercised 8 minutes and 41 seconds with no chest pain, but developed 2 mm of ST depression. There was no evidence of wall motion abnormalities in the presence of the ST depression and LV function remained normal. It was felt that the nuclear imaging and echocardiographic findings were normal with the EKG abnormalities. Therefore, there was no clear-cut clinical evidence of ischemia. She has been continued on medical treatment.    She had an echocardiogram in January 2008 which demonstrated normal LV function with EF in the 70-75% range. There was mild to moderate mitral regurgitation. She also had an Adenosine Cardiolite myocardial perfusion scan which demonstrated normal perfusion imaging and normal LV function. She had repeat stress nuclear cardiac imaging on September 20, 2010 which demonstrated normal perfusion and normal LV function.    She underwent stress nuclear cardiac imaging on 11/6/13 which demonstrated normal perfusion with no scaring or ischemia. She did have positive EKG changes without chest pain with exercise. The ejection fraction was estimated in the 80% range. She had followup stress nuclear cardiac imaging on 12/02/15, which demonstrated normal perfusion with no evidence of scarring or ischemia.  Ejection fraction was greater than 80% with a hyperdynamic ventricle.    Because of the frequent bradycardia with pauses on the Holter monitor on 05/19/16, we reduced the betablocker with no improvement.    She developed a racing heartbeat and was found to have atrial fibrillation when she was seen in the emergency room on 04/24/16. She was treated with atenolol 100 mg daily, which had to be reduced because of frequent pauses on the Holter monitor. But, a subsequent Holter monitor continued to reveal frequent long pauses with baseline atrial fibrillation. She subsequently underwent permanent pacemaker implantation on 05/31/16 with a dual chamber MRI compatible Medtronic pacemaker.      Review of Systems   Constitutional: Negative for malaise/fatigue and weight loss. HENT: Negative for hearing loss. Eyes: Negative for blurred vision and double vision. Respiratory: Positive for shortness of breath. Cardiovascular: Positive for chest pain and palpitations. Negative for orthopnea, claudication, leg swelling and PND. Gastrointestinal: Negative for blood in stool, heartburn and melena. Genitourinary: Negative for dysuria, frequency, hematuria and urgency. Musculoskeletal: Negative for back pain and joint pain. Skin: Negative for itching and rash. Neurological: Positive for dizziness. Negative for loss of consciousness and weakness. Psychiatric/Behavioral: Negative for depression and memory loss. Physical Exam   Constitutional: She is oriented to person, place, and time. She appears well-developed and well-nourished. HENT:   Head: Normocephalic and atraumatic. Eyes: Conjunctivae are normal. Pupils are equal, round, and reactive to light. Neck: Normal range of motion. Neck supple. No JVD present. Cardiovascular: Normal rate, S1 normal and S2 normal.  An irregularly irregular rhythm present. No extrasystoles are present. PMI is not displaced. Exam reveals no gallop and no friction rub. No murmur heard. Pulses:       Carotid pulses are 3+ on the right side, and 3+ on the left side. Pulmonary/Chest: Effort normal. She has no rales. Abdominal: Soft. There is no tenderness. Musculoskeletal: She exhibits no edema. Neurological: She is alert and oriented to person, place, and time. No cranial nerve deficit. Skin: Skin is warm and dry. Psychiatric: She has a normal mood and affect. Her behavior is normal.     Visit Vitals    /80    Pulse 69    Ht 5' 2\" (1.575 m)    Wt 73.9 kg (163 lb)    SpO2 97%    BMI 29.81 kg/m2       Past Medical History:   Diagnosis Date    History of echocardiogram 01/02/2008    EF 70-75%. Mild-mod MR. Mod TR. PASP 40 mmHg.      History of myocardial perfusion scan 12/02/2015    Low risk. No ischemia or prior infarction. EF >80%. No chg from study of 11/7/13.  Hypercholesterolemia     Hypertension     Mild coronary artery disease 09/20/95    Cath: Minor plaquing of the distal segment of the circumflex artery on a kink with less than 30% stenosis    Mitral valve prolapse syndrome     with mid to late systolic murmur with no click    Palpitations     S/P cardiac cath 09/20/1995    dCx 30%. Otherwise patent coronaries.  Stroke [facility])     possible small embolic       Social History     Social History    Marital status:      Spouse name: N/A    Number of children: N/A    Years of education: N/A     Occupational History    Not on file.      Social History Main Topics    Smoking status: Former Smoker     Packs/day: 1.00     Years: 13.00     Quit date: 5/10/1969    Smokeless tobacco: Never Used    Alcohol use No    Drug use: No    Sexual activity: Not on file     Other Topics Concern    Not on file     Social History Narrative       Family History   Problem Relation Age of Onset    Diabetes Mother     Hypertension Mother     High Cholesterol Mother     Stroke Mother     Cancer Father     Hypertension Brother     High Cholesterol Brother     Breast Cancer Maternal Aunt      x2, age 36 and 72       Past Surgical History:   Procedure Laterality Date    HX ACL RECONSTRUCTION      left    HX CATARACT REMOVAL      bilateral    HX TOAN AND BSO      HX TONSILLECTOMY      JUNG BIOPSY BREAST STEREOTACTIC      left breast x 2 benign       Current Outpatient Prescriptions   Medication Sig Dispense Refill    apixaban (ELIQUIS) 5 mg tablet TAKE 1 TABLET BY MOUTH TWICE DAILY 90 Tab 3    carvedilol (COREG) 25 mg tablet TAKE 1 TABLET BY MOUTH TWICE DAILY WITH MEALS (Patient taking differently: TAKE ONE-HALF A  TABLET BY MOUTH IN THE MORNING AND TAKE 1 TABLET IN THE EVENING) 60 Tab 6    losartan-hydroCHLOROthiazide (HYZAAR) 50-12.5 mg per tablet TK 1 T PO ONCE A DAY  1    DOCOSAHEXANOIC ACID/EPA (FISH OIL PO) Take  by mouth three (3) times daily.  ASCORBATE CALCIUM (VITAMIN C PO) Take  by mouth.  cholecalciferol, VITAMIN D3, (VITAMIN D3) 5,000 unit tab tablet Take  by mouth daily.  MAGNESIUM CHLORIDE (SLOW-MAG PO) Take  by mouth two (2) times a day.  oxyCODONE-acetaminophen (PERCOCET) 5-325 mg per tablet Take 1 Tab by mouth every four (4) hours as needed. Max Daily Amount: 6 Tabs. 20 Tab 0    pioglitazone (ACTOS) 15 mg tablet Take 7.5 mg by mouth daily.  PREDNISONE Take 5 mg by mouth daily.  glipiZIDE (GLUCOTROL) 5 mg tablet Take 5 mg by mouth two (2) times a day.  levothyroxine (SYNTHROID) 75 mcg tablet Take  by mouth Daily (before breakfast).  loteprednol etabonate (LOTEMAX) 0.5 % ophthalmic suspension Administer 1 Drop to both eyes daily.  cycloSPORINE (RESTASIS) 0.05 % ophthalmic emulsion Administer 1 Drop to both eyes two (2) times a day.  simvastatin (ZOCOR) 20 mg tablet Take 20 mg by mouth nightly.  potassium chloride (KLOR-CON M10) 10 mEq tablet Take 10 mEq by mouth two (2) times a day. EKG: unchanged from previous tracings, atrial fibrillation, rate controlled, occasional VVI pacer activities  . ASSESSMENT and PLAN  Encounter Diagnoses   Name Primary?  Chronic atrial fibrillation (HCC) Yes    Palpitations     Mitral valve prolapse syndrome with late systolic murmur.  Atherosclerosis of native coronary artery of native heart without angina pectoris     Tachycardia-bradycardia syndrome ([address]     Pacemaker     Hypertension     Vestibular dysfunction, unspecified laterality    She continues to do well. She has had no symptoms to indicate angina or cardiac decompensation. Her atrial fibrillation has been stable with good rate control. She does have mild dyspnea on exertion most likely from the left ventricular diastolic dysfunction.   Her blood pressure is mildly out of control and at this point I will add Aldactone 25 mg a day and discontinue potassium altogether.

## 2018-04-09 NOTE — PROGRESS NOTES
1. Have you been to the ER, urgent care clinic since your last visit? Hospitalized since your last visit? no  2. Have you seen or consulted any other health care providers outside of the [address] [place] [address] since your last visit? Include any pap smears or colon screening.   no

## 2018-04-23 ENCOUNTER — HOSPITAL ENCOUNTER (OUTPATIENT)
Dept: LAB | Age: 78
Discharge: HOME | End: 2018-04-23
Payer: MEDICARE

## 2018-04-23 DIAGNOSIS — I11.9 BENIGN HYPERTENSIVE HEART DISEASE WITHOUT HEART FAILURE: ICD-10-CM

## 2018-04-23 LAB
ANION GAP SERPL CALC-SCNC: 8 MMOL/L (ref 3–18)
BUN SERPL-MCNC: 28 MG/DL (ref 7–18)
BUN/CREAT SERPL: 22 (ref 12–20)
CALCIUM SERPL-MCNC: 10 MG/DL (ref 8.5–10.1)
CHLORIDE SERPL-SCNC: 106 MMOL/L (ref 100–108)
CO2 SERPL-SCNC: 26 MMOL/L (ref 21–32)
CREAT SERPL-MCNC: 1.26 MG/DL (ref 0.6–1.3)
GLUCOSE SERPL-MCNC: 178 MG/DL (ref 74–99)
POTASSIUM SERPL-SCNC: 3.9 MMOL/L (ref 3.5–5.5)
SODIUM SERPL-SCNC: 140 MMOL/L (ref 136–145)

## 2018-04-23 PROCEDURE — 80048 BASIC METABOLIC PNL TOTAL CA: CPT | Performed by: INTERNAL MEDICINE

## 2018-04-23 PROCEDURE — 36415 COLL VENOUS BLD VENIPUNCTURE: CPT | Performed by: INTERNAL MEDICINE

## 2018-04-25 ENCOUNTER — TELEPHONE (OUTPATIENT)
Dept: CARDIOLOGY CLINIC | Age: 78
End: 2018-04-25

## 2018-04-27 ENCOUNTER — CLINICAL SUPPORT (OUTPATIENT)
Dept: CARDIOLOGY CLINIC | Age: 78
End: 2018-04-27

## 2018-04-27 VITALS
DIASTOLIC BLOOD PRESSURE: 68 MMHG | OXYGEN SATURATION: 97 % | HEIGHT: 62 IN | BODY MASS INDEX: 29.44 KG/M2 | HEART RATE: 66 BPM | SYSTOLIC BLOOD PRESSURE: 100 MMHG | WEIGHT: 160 LBS

## 2018-04-27 DIAGNOSIS — I10 HYPERTENSION, UNSPECIFIED TYPE: Primary | ICD-10-CM

## 2018-04-27 NOTE — PROGRESS NOTES
Bi Ruffin is a 77 y.o. female that is here for a blood pressure check after d/c her potassium and starting Aldactone 25 mg 1 tablet daily at her last office visit with Dr Christina Arias on 4/9/18. Her current medications are listed below. Current Outpatient Prescriptions   Medication Sig    spironolactone (ALDACTONE) 25 mg tablet Take 1 Tab by mouth daily.  apixaban (ELIQUIS) 5 mg tablet TAKE 1 TABLET BY MOUTH TWICE DAILY    carvedilol (COREG) 25 mg tablet TAKE 1 TABLET BY MOUTH TWICE DAILY WITH MEALS (Patient taking differently: TAKE ONE-HALF A  TABLET BY MOUTH IN THE MORNING AND TAKE 1 TABLET IN THE EVENING)    losartan-hydroCHLOROthiazide (HYZAAR) 50-12.5 mg per tablet TK 1 T PO ONCE A DAY    DOCOSAHEXANOIC ACID/EPA (FISH OIL PO) Take  by mouth three (3) times daily.  ASCORBATE CALCIUM (VITAMIN C PO) Take  by mouth.  cholecalciferol, VITAMIN D3, (VITAMIN D3) 5,000 unit tab tablet Take  by mouth daily.  MAGNESIUM CHLORIDE (SLOW-MAG PO) Take  by mouth two (2) times a day.  oxyCODONE-acetaminophen (PERCOCET) 5-325 mg per tablet Take 1 Tab by mouth every four (4) hours as needed. Max Daily Amount: 6 Tabs.  pioglitazone (ACTOS) 15 mg tablet Take 7.5 mg by mouth daily.  PREDNISONE Take 5 mg by mouth daily.  glipiZIDE (GLUCOTROL) 5 mg tablet Take 5 mg by mouth two (2) times a day.  levothyroxine (SYNTHROID) 75 mcg tablet Take  by mouth Daily (before breakfast).  loteprednol etabonate (LOTEMAX) 0.5 % ophthalmic suspension Administer 1 Drop to both eyes daily.  cycloSPORINE (RESTASIS) 0.05 % ophthalmic emulsion Administer 1 Drop to both eyes two (2) times a day.  simvastatin (ZOCOR) 20 mg tablet Take 20 mg by mouth nightly. No current facility-administered medications for this visit.                 Her   Visit Vitals    /68    Pulse 66    Ht 5' 2\" (1.575 m)    Wt 72.6 kg (160 lb)    SpO2 97%    BMI 29.26 kg/m2     Patient was seen in the office today to recheck her BP/HR after d/c her potassium and starting Aldactone 25 mg 1 tablet daily at her last office visit with Dr Helene Evans on 4/9/18. Patient's bp has dropped from 150/80 at last office visit to 100/68 at today's visit. Patient denies having any cardiac symptoms such as chest pain, palpitations, dizziness, SOB or swelling. Patient states she takes all medications as prescribed and takes her morning medications around 10 to 10:30 am daily and evening doses around 9 pm. Patient had a bmp done 4/23/2018 after starting Aldactone and stopping potassium, and the results were reviewed with patient prior to today's appt. Patient did not have any further questions or concerns at this time. Patient agrees to continue taking medications as prescribed.

## 2018-04-30 ENCOUNTER — HOSPITAL ENCOUNTER (OUTPATIENT)
Dept: MAMMOGRAPHY | Age: 78
Discharge: HOME | End: 2018-04-30
Attending: INTERNAL MEDICINE
Payer: MEDICARE

## 2018-04-30 DIAGNOSIS — Z12.31 VISIT FOR SCREENING MAMMOGRAM: ICD-10-CM

## 2018-04-30 PROCEDURE — 77067 SCR MAMMO BI INCL CAD: CPT

## 2018-06-06 ENCOUNTER — OFFICE VISIT (OUTPATIENT)
Dept: CARDIOLOGY CLINIC | Age: 78
End: 2018-06-06

## 2018-06-06 DIAGNOSIS — I49.5 TACHYCARDIA-BRADYCARDIA SYNDROME (HCC): Primary | ICD-10-CM

## 2018-06-06 DIAGNOSIS — Z95.0 PACEMAKER: ICD-10-CM

## 2018-06-22 NOTE — PROGRESS NOTES
I have personally seen and evaluated the device findings. Interrogation reviewed and I agree with assessment.     Nuvia Clifford

## 2018-07-20 ENCOUNTER — HOSPITAL ENCOUNTER (OUTPATIENT)
Dept: ULTRASOUND IMAGING | Age: 78
Discharge: HOME | End: 2018-07-20
Attending: INTERNAL MEDICINE
Payer: MEDICARE

## 2018-07-20 DIAGNOSIS — R10.84 GENERALIZED ABDOMINAL PAIN: ICD-10-CM

## 2018-07-20 PROCEDURE — 76700 US EXAM ABDOM COMPLETE: CPT

## 2018-07-20 PROCEDURE — 76856 US EXAM PELVIC COMPLETE: CPT

## 2018-08-07 ENCOUNTER — OFFICE VISIT (OUTPATIENT)
Dept: CARDIOLOGY CLINIC | Age: 78
End: 2018-08-07

## 2018-08-07 VITALS
SYSTOLIC BLOOD PRESSURE: 124 MMHG | WEIGHT: 160 LBS | DIASTOLIC BLOOD PRESSURE: 68 MMHG | OXYGEN SATURATION: 98 % | HEIGHT: 62 IN | BODY MASS INDEX: 29.44 KG/M2 | HEART RATE: 98 BPM

## 2018-08-07 DIAGNOSIS — I49.5 TACHYCARDIA-BRADYCARDIA SYNDROME (HCC): ICD-10-CM

## 2018-08-07 DIAGNOSIS — I11.9 BENIGN HYPERTENSIVE HEART DISEASE WITHOUT HEART FAILURE: ICD-10-CM

## 2018-08-07 DIAGNOSIS — R00.2 PALPITATION: Primary | ICD-10-CM

## 2018-08-07 DIAGNOSIS — I25.10 ATHEROSCLEROSIS OF NATIVE CORONARY ARTERY OF NATIVE HEART WITHOUT ANGINA PECTORIS: ICD-10-CM

## 2018-08-07 DIAGNOSIS — H83.2X9 VESTIBULAR DYSFUNCTION, UNSPECIFIED LATERALITY: ICD-10-CM

## 2018-08-07 DIAGNOSIS — I48.20 CHRONIC ATRIAL FIBRILLATION (HCC): ICD-10-CM

## 2018-08-07 DIAGNOSIS — Z95.0 PACEMAKER: ICD-10-CM

## 2018-08-07 DIAGNOSIS — I34.1 MITRAL VALVE PROLAPSE SYNDROME: ICD-10-CM

## 2018-08-07 RX ORDER — LOSARTAN POTASSIUM 50 MG/1
50 TABLET ORAL DAILY
COMMUNITY
End: 2018-08-07

## 2018-08-07 NOTE — MR AVS SNAPSHOT
[address] [id number] [address] [id number] [phone] Patient: Deloris Goode MRN: [id number] GEORGETTE:[birth year] Visit Information Date & Time Provider Department Dept. Phone Encounter #  
 8/7/2018  9:00 AM Skye Mares MD [facility] [address] [id number] Your Appointments 10/15/2018  1:40 PM  
Follow Up with Skye Mares MD  
[facility] ([address] [place] [address]) Appt Note: 6 month f/up [address] [address] [address] [address] [phone] [phone]  
  
    
 10/15/2018  1:40 PM  
PROCEDURE with Pacer Hv Csi [facility] ([address] [place] [address]) Appt Note: w/chough appt [address] [address] [address] [id number] [phone] [address] [place] [address] [address] Upcoming Health Maintenance Date Due DTaP/Tdap/Td series (1 - Tdap) 1/4/1960 ZOSTER VACCINE AGE 60> 11/4/1998 GLAUCOMA SCREENING Q2Y 1/4/2004 Bone Densitometry (Dexa) Screening 1/4/2004 Pneumococcal 65+ Low/Medium Risk (1 of 2 - PCV13) 1/4/2004 COLONOSCOPY 7/15/2015 MEDICARE YEARLY EXAM 3/14/2018 Influenza Age 5 to Adult 8/1/2018 Allergies as of 8/7/2018  Review Complete On: 4/9/2018 By: Skye Mares MD  
  
 Severity Noted Reaction Type Reactions Tagamet [Cimetidine]  05/10/2011    Unknown (comments) Current Immunizations  Never Reviewed No immunizations on file. Not reviewed this visit You Were Diagnosed With   
  
 Codes Comments Chronic atrial fibrillation (HCC)    -  Primary ICD-10-CM: I43.6 ICD-9-CM: 427.31 Palpitation     ICD-10-CM: R00.2 ICD-9-CM: 785.1 Mitral valve prolapse syndrome     ICD-10-CM: I34.1 ICD-9-CM: 424.0 Atherosclerosis of native coronary artery of native heart without angina pectoris     ICD-10-CM: I25.10 ICD-9-CM: 414.01 Tachycardia-bradycardia syndrome ([address]     ICD-10-CM: I49.5 ICD-9-CM: 427.81 Pacemaker     ICD-10-CM: Z95.0 ICD-9-CM: V45.01 Benign hypertensive heart disease without heart failure     ICD-10-CM: I11.9 ICD-9-CM: 402.10 Vestibular dysfunction, unspecified laterality     ICD-10-CM: L76.3S7 ICD-9-CM: 386.50 Vitals BP Pulse Height(growth percentile) Weight(growth percentile) SpO2 BMI  
 124/68 98 5' 2\" (1.575 m) 160 lb (72.6 kg) 98% 29.26 kg/m2 OB Status Smoking Status Postmenopausal Former Smoker Vitals History BMI and BSA Data Body Mass Index Body Surface Area  
 29.26 kg/m 2 1.78 m 2 Preferred Pharmacy Pharmacy Name Phone [id number]- [address] [place] [address] [facility] [phone] Your Updated Medication List  
  
   
This list is accurate as of 8/7/18 10:07 AM.  Always use your most recent med list.  
  
  
  
  
 apixaban 5 mg tablet Commonly known as:  ELIQUIS  
TAKE 1 TABLET BY MOUTH TWICE DAILY  
  
 carvedilol 25 mg tablet Commonly known as:  COREG  
TAKE 1 TABLET BY MOUTH TWICE DAILY WITH MEALS  
  
 glipiZIDE 5 mg tablet Commonly known as:  Davidrtania [place] Take 5 mg by mouth two (2) times a day. LOTEMAX 0.5 % ophthalmic suspension Generic drug:  loteprednol etabonate Administer 1 Drop to both eyes daily. pioglitazone 15 mg tablet Commonly known as:  ACTOS Take 7.5 mg by mouth daily. PREDNISONE Take 5 mg by mouth daily. RESTASIS 0.05 % ophthalmic emulsion Generic drug:  cycloSPORINE Administer 1 Drop to both eyes two (2) times a day. spironolactone 25 mg tablet Commonly known as:  ALDACTONE Take 1 Tab by mouth daily. SYNTHROID 75 mcg tablet Generic drug:  levothyroxine Take  by mouth Daily (before breakfast). ZOCOR 20 mg tablet Generic drug:  simvastatin Take 20 mg by mouth nightly. We Performed the Following AMB POC EKG ROUTINE W/ 12 LEADS, INTER & REP [17862 CPT(R)] Patient Instructions Increase carvedilol to 25 mg one tablet twice daily Stop losartin Introducing [facility]! Hermilo Pairkh introduces iMemories patient portal. Now you can access parts of your medical record, email your doctor's office, and request medication refills online. 1. In your internet browser, go to [email or web address] 2. Click on the First Time User? Click Here link in the Sign In box. You will see the New Member Sign Up page. 3. Enter your iMemories Access Code exactly as it appears below. You will not need to use this code after youve completed the sign-up process. If you do not sign up before the expiration date, you must request a new code. · iMemories Access Code: [email or web address] Expires: 10/17/2018 10:09 AM 
 
4. Enter the last four digits of your Social Security Number (xxxx) and Date of Birth (mm/dd/yyyy) as indicated and click Submit. You will be taken to the next sign-up page. 5. Create a iMemories ID. This will be your iMemories login ID and cannot be changed, so think of one that is secure and easy to remember. 6. Create a iMemories password. You can change your password at any time. 7. Enter your Password Reset Question and Answer. This can be used at a later time if you forget your password. 8. Enter your e-mail address. You will receive e-mail notification when new information is available in [address] 9. Click Sign Up. You can now view and download portions of your medical record. 10. Click the Download Summary menu link to download a portable copy of your medical information. If you have questions, please visit the Frequently Asked Questions section of the iMemories website. Remember, iMemories is NOT to be used for urgent needs. For medical emergencies, dial 911. Now available from your iPhone and Android! Please provide this summary of care documentation to your next provider. Your primary care clinician is listed as [facility]. If you have any questions after today's visit, please call [phone].

## 2018-08-07 NOTE — PROGRESS NOTES
HISTORY OF PRESENT ILLNESS  Reno Cadena is a 77 y.o. female. HPI  This appointment was requested  today for the evaluation of frequent palpitation, fatigue, weakness and dyspnea on exertion. She indicates that she gets weakened and short of breath along with palpitation with the slightest amount of activities in the past 4-5 months or so. She denies chest pain, resting dyspnea, orthopnea, PND. She has had no dizziness or syncope. She denies any symptoms of TIA or amaurosis fugax. She was concerned about possible fluid retention as she has developed a moon face. She has been on Prednisone for polymyalgia rheumatica and she is now down to 5 mg of Prednisone daily. However, she has lost approximately 3 pounds. When she was seen by her internist last time, Carvedilol was reduced to 12.5 mg twice a day and Aldactone was reduced to every other day presumably because of low blood pressure. She has a history of palpitations and mild coronary artery disease. She had a cardiac catheterization on September 20, 1995, which demonstrated:    1. Patent left main trunk. 2. Patent LAD. 3. Minor plaquing of the distal segment of the circumflex artery on a kink with less than 30% stenosis. 4. Patent nondominant right coronary artery with very mild diffuse disease.    5. Normal left ventricle. She has had a mid to late systolic murmur at the apex with no click, and the diagnosis of mitral valve prolapse and mild mitral regurgitation was established clinically as well as by echocardiographic findings. She had a positive treadmill stress test but negative thallium findings.    She has some spotting in her vision in the right eye, for which she has had multiple diagnostic procedures, including an MRI scan, and was told that she might have had an embolic stroke or a mild hemorrhage with the hypertension. She has been on Plavix since then.  She had an adenosine Cardiolite myocardial perfusion scan on 02/22/05, at which time she had 2 mm ST depression on electrocardiogram with adenosine only, but the perfusion study was normal. The gated SPECT imaging revealed EF in the 70% range. Because of the EKG abnormalities with the adenosine, the possibility of balanced ischemia with multi-vessel coronary disease was entertained and subsequently, she had a stress echocardiogram on 03/16/05, at which time she exercised 8 minutes and 41 seconds with no chest pain, but developed 2 mm of ST depression. There was no evidence of wall motion abnormalities in the presence of the ST depression and LV function remained normal. It was felt that the nuclear imaging and echocardiographic findings were normal with the EKG abnormalities. Therefore, there was no clear-cut clinical evidence of ischemia. She has been continued on medical treatment.    She had an echocardiogram in January 2008 which demonstrated normal LV function with EF in the 70-75% range. There was mild to moderate mitral regurgitation. She also had an Adenosine Cardiolite myocardial perfusion scan which demonstrated normal perfusion imaging and normal LV function. She had repeat stress nuclear cardiac imaging on September 20, 2010 which demonstrated normal perfusion and normal LV function.    She underwent stress nuclear cardiac imaging on 11/6/13 which demonstrated normal perfusion with no scaring or ischemia. She did have positive EKG changes without chest pain with exercise. The ejection fraction was estimated in the 80% range.   She had followup stress nuclear cardiac imaging on 12/02/15, which demonstrated normal perfusion with no evidence of scarring or ischemia.  Ejection fraction was greater than 80% with a hyperdynamic ventricle.    Because of the frequent bradycardia with pauses on the Holter monitor on 05/19/16, we reduced the betablocker with no improvement.    She developed a racing heartbeat and was found to have atrial fibrillation when she was seen in the emergency room on 04/24/16. She was treated with atenolol 100 mg daily, which had to be reduced because of frequent pauses on the Holter monitor. But, a subsequent Holter monitor continued to reveal frequent long pauses with baseline atrial fibrillation. She subsequently underwent permanent pacemaker implantation on 05/31/16 with a dual chamber MRI compatible Medtronic pacemaker. Review of Systems   Constitutional: Negative for malaise/fatigue and weight loss. HENT: Negative for hearing loss. Eyes: Negative for blurred vision and double vision. Respiratory: Positive for shortness of breath. Cardiovascular: Positive for palpitations and leg swelling. Negative for chest pain, orthopnea, claudication and PND. Gastrointestinal: Negative for blood in stool, heartburn and melena. Genitourinary: Negative for dysuria, frequency, hematuria and urgency. Musculoskeletal: Negative for back pain and joint pain. Skin: Negative for itching and rash. Neurological: Negative for dizziness, loss of consciousness and weakness. Psychiatric/Behavioral: Negative for depression and memory loss. Physical Exam   Constitutional: She is oriented to person, place, and time. She appears well-developed and well-nourished. HENT:   Head: Normocephalic and atraumatic. Eyes: Conjunctivae are normal. Pupils are equal, round, and reactive to light. Neck: Normal range of motion. Neck supple. No JVD present. Cardiovascular: Normal rate, S1 normal and S2 normal.  An irregularly irregular rhythm present. No extrasystoles are present. PMI is not displaced. Exam reveals no gallop and no friction rub. Murmur heard. High-pitched blowing mid to late systolic murmur is present  at the apex  Pulses:       Carotid pulses are 3+ on the right side, and 3+ on the left side. Pulmonary/Chest: Effort normal. She has no rales. Abdominal: Soft. There is no tenderness. Musculoskeletal: She exhibits no edema.    Neurological: She is alert and oriented to person, place, and time. No cranial nerve deficit. Skin: Skin is warm and dry. Psychiatric: She has a normal mood and affect. Her behavior is normal.     Visit Vitals    /68    Pulse 98    Ht 5' 2\" (1.575 m)    Wt 72.6 kg (160 lb)    SpO2 98%    BMI 29.26 kg/m2       Past Medical History:   Diagnosis Date    History of echocardiogram 01/02/2008    EF 70-75%. Mild-mod MR. Mod TR. PASP 40 mmHg.  History of myocardial perfusion scan 12/02/2015    Low risk. No ischemia or prior infarction. EF >80%. No chg from study of 11/7/13.  Hypercholesterolemia     Hypertension     Mild coronary artery disease 09/20/95    Cath: Minor plaquing of the distal segment of the circumflex artery on a kink with less than 30% stenosis    Mitral valve prolapse syndrome     with mid to late systolic murmur with no click    Palpitations     S/P cardiac cath 09/20/1995    dCx 30%. Otherwise patent coronaries.  Stroke [facility])     possible small embolic       Social History     Social History    Marital status:      Spouse name: N/A    Number of children: N/A    Years of education: N/A     Occupational History    Not on file.      Social History Main Topics    Smoking status: Former Smoker     Packs/day: 1.00     Years: 13.00     Quit date: 5/10/1969    Smokeless tobacco: Never Used    Alcohol use No    Drug use: No    Sexual activity: Not on file     Other Topics Concern    Not on file     Social History Narrative       Family History   Problem Relation Age of Onset    Diabetes Mother     Hypertension Mother     High Cholesterol Mother     Stroke Mother     Cancer Father     Hypertension Brother     High Cholesterol Brother     Breast Cancer Maternal Aunt      x2, age 36 and 72       Past Surgical History:   Procedure Laterality Date    HX ACL RECONSTRUCTION      left    HX CATARACT REMOVAL      bilateral    HX TOAN AND BSO      HX TONSILLECTOMY      JUNG BIOPSY BREAST STEREOTACTIC      left breast x 2 benign       Current Outpatient Prescriptions   Medication Sig Dispense Refill    losartan (COZAAR) 50 mg tablet Take 50 mg by mouth daily.  spironolactone (ALDACTONE) 25 mg tablet Take 1 Tab by mouth daily. (Patient taking differently: Take 25 mg by mouth daily. Indications: patient taking half a tablet everyday) 30 Tab 6    apixaban (ELIQUIS) 5 mg tablet TAKE 1 TABLET BY MOUTH TWICE DAILY 90 Tab 3    carvedilol (COREG) 25 mg tablet TAKE 1 TABLET BY MOUTH TWICE DAILY WITH MEALS (Patient taking differently: TAKE ONE-HALF A  TABLET BY MOUTH IN THE MORNING AND TAKE 1 TABLET IN THE EVENING) 60 Tab 6    pioglitazone (ACTOS) 15 mg tablet Take 7.5 mg by mouth daily.  PREDNISONE Take 5 mg by mouth daily.  glipiZIDE (GLUCOTROL) 5 mg tablet Take 5 mg by mouth two (2) times a day.  levothyroxine (SYNTHROID) 75 mcg tablet Take  by mouth Daily (before breakfast).  loteprednol etabonate (LOTEMAX) 0.5 % ophthalmic suspension Administer 1 Drop to both eyes daily.  cycloSPORINE (RESTASIS) 0.05 % ophthalmic emulsion Administer 1 Drop to both eyes two (2) times a day.  simvastatin (ZOCOR) 20 mg tablet Take 20 mg by mouth nightly. EKG: unchanged from previous tracings, atrial fibrillation, rate controlled, rare VVI pacer activities  . ASSESSMENT and PLAN  Encounter Diagnoses   Name Primary?  Palpitation Yes    Chronic atrial fibrillation (HCC)     Mitral valve prolapse syndrome with late systolic murmur.  Atherosclerosis of native coronary artery of native heart without angina pectoris     Tachycardia-bradycardia syndrome ([address]     Pacemaker     Hypertension     Vestibular dysfunction, unspecified laterality    It is possible that she has had fast ventricular response from her chronic atrial fibrillation since the Carvedilol has been reduced.  At this time, I would increase Carvedilol back to 25 mg twice a day and discontinue Losartan for the time being for possible low blood pressure issue. She shows no signs of cardiac decompensation. Her moon face is definitely related to Prednisone. She was advised that she shows no signs of fluid retention. She could be retaining some fluid from the Actos but she does not have any visible leg edema at this time. Once again, she has lost about 3 pounds in 3 months. If she continues with palpitation with exertion despite the fact that Carvedilol has been increased to 25 mg twice a day, I would then proceed with heart monitor.

## 2018-08-28 ENCOUNTER — CLINICAL SUPPORT (OUTPATIENT)
Dept: CARDIOLOGY CLINIC | Age: 78
End: 2018-08-28

## 2018-08-28 VITALS
HEART RATE: 72 BPM | SYSTOLIC BLOOD PRESSURE: 118 MMHG | OXYGEN SATURATION: 98 % | BODY MASS INDEX: 28.89 KG/M2 | DIASTOLIC BLOOD PRESSURE: 84 MMHG | WEIGHT: 157 LBS | HEIGHT: 62 IN

## 2018-08-28 DIAGNOSIS — Z95.0 PACEMAKER: ICD-10-CM

## 2018-08-28 DIAGNOSIS — I48.19 PERSISTENT ATRIAL FIBRILLATION (HCC): ICD-10-CM

## 2018-08-28 DIAGNOSIS — R00.2 PALPITATION: Primary | ICD-10-CM

## 2018-08-28 DIAGNOSIS — I11.9 BENIGN HYPERTENSIVE HEART DISEASE WITHOUT HEART FAILURE: ICD-10-CM

## 2018-08-28 DIAGNOSIS — I49.5 TACHYCARDIA-BRADYCARDIA SYNDROME (HCC): ICD-10-CM

## 2018-08-28 RX ORDER — SODIUM CHLORIDE 50 MG/ML
1 SOLUTION/ DROPS OPHTHALMIC DAILY
COMMUNITY
End: 2022-09-15

## 2018-08-28 NOTE — PROGRESS NOTES
Mrs. Sharri Pappas was seen today for a blood pressure check. When seen by Dr. Cameron Nava on 8/7/18, she complained of increasing palpitations, fatigue, weakness, and GOMEZ. Dr. Cameron Nava increased her carvedilol to 25mg BID and discontinued her losartan to offset possible hypotension with the increased dose of carvedilol. She states that she has noticed less palpitations but her other symptoms are still present. She reports that she saw Dr. Herby Favre recently and she states that he feels that some of her symptoms are probably due to \"glucose variability\" and also due to her glipizide. He discontinued the glipizide and increased her Synthroid to 0.88mcg per day. She just began taking the adjusted dose of Synthroid today. She also mentioned that she saw her PCP, Dr. Darlene Richmond today and that she was started on antibiotics which she will be taking twice a day for 10 days (is not sure what the medication is). Her medication list is as follows: 
Current Outpatient Prescriptions Medication Sig  carboxymethyl/gly/poly80/PF (REFRESH OPTIVE BAILEE-3 OP) Apply  to eye as needed.  sodium chloride (SUNI-5) 5 % ophthalmic solution Administer 1 Drop to right eye daily.  ophthalmic irrigation solution (OCUSOFT IRRIGATING OPHTH SOLN) drop Apply 1 Drop to eye daily. Right eye  spironolactone (ALDACTONE) 25 mg tablet Take 1 Tab by mouth daily. (Patient taking differently: Take 25 mg by mouth daily. Indications: patient taking half a tablet everyday)  apixaban (ELIQUIS) 5 mg tablet TAKE 1 TABLET BY MOUTH TWICE DAILY  carvedilol (COREG) 25 mg tablet TAKE 1 TABLET BY MOUTH TWICE DAILY WITH MEALS (Patient taking differently: BID)  pioglitazone (ACTOS) 15 mg tablet Take 7.5 mg by mouth daily.  levothyroxine (SYNTHROID) 75 mcg tablet Take  by mouth Daily (before breakfast).  loteprednol etabonate (LOTEMAX) 0.5 % ophthalmic suspension Administer 1 Drop to both eyes daily.  cycloSPORINE (RESTASIS) 0.05 % ophthalmic emulsion Administer 1 Drop to both eyes two (2) times a day.  simvastatin (ZOCOR) 20 mg tablet Take 20 mg by mouth nightly. No current facility-administered medications for this visit. She reports that she has completed her prednisone therapy. Medication list reconciled. Her vital signs today were: 
Visit Vitals  /84 (BP 1 Location: Right arm, BP Patient Position: Sitting)  Pulse 72  Ht 5' 2\" (1.575 m)  Wt 71.2 kg (157 lb)  SpO2 98%  BMI 28.72 kg/m2 Her blood pressure is stable and her heart rate is well-controlled Neck:  Supple, no JVD, no carotid bruits CV:  Normal S1 and  S2, high pitched blowing mid to late IVY noted at the apex, no rubs, or gallops noted. Irregularly, irregular rhythm Lungs:  Clear to ausculation throughout, no wheezes or rales Abd:  Soft, non-tender, non-distended with good bowel sounds. No hepatosplenomegaly Extremities:  No edema Impression: 1. Palpitations, improved on increased dose of carvedilol 2. Chronic atrial fibrillation, rate controlled 3. Tachy/Oumar syndrome, s/p Medtronic MRI compatible dual chamber pacemaker implant in May 2016 4. Essential hypertension, blood pressure stable 5. Mitral valve prolapse with late systolic murmur 6. History of vestibular dysfunction Plan: 1. Continue present medication regimen 2. Follow-up with Dr. Nino Ragland as scheduled and as needed.  
 
Xander Dewey MSN, FNP-BC

## 2018-08-28 NOTE — PROGRESS NOTES
1. Have you been to the ER, urgent care clinic since your last visit? Hospitalized since your last visit? No 
 
2. Have you seen or consulted any other health care providers outside of the [address] [place] [address] since your last visit? Include any pap smears or colon screening.  No

## 2018-08-28 NOTE — MR AVS SNAPSHOT
[address] [facility] [address] [address] [address] [id number] [phone] Patient: Howard Luque MRN: [id number] [place]:[birth year] Visit Information Date & Time Provider Department Dept. Phone Encounter #  
 8/28/2018  2:00 PM Dora Dockery NP [facility] [phone] [id number] Your Appointments 10/15/2018  1:40 PM  
Follow Up with Bibiana Valdivia MD  
[facility] Appt Note: 6 month f/up [facility] [address] [address] [id number] [phone] [phone]  
  
    
 10/15/2018  1:40 PM  
PROCEDURE with Pacer Eliel Csi [facility] ([facility]) Appt Note: w/chough appt [facility] [address] [address] [address] [phone] [address] [place] [address] [address] Upcoming Health Maintenance Date Due DTaP/Tdap/Td series (1 - Tdap) 1/4/1960 ZOSTER VACCINE AGE 60> 11/4/1998 GLAUCOMA SCREENING Q2Y 1/4/2004 Bone Densitometry (Dexa) Screening 1/4/2004 Pneumococcal 65+ Low/Medium Risk (1 of 2 - PCV13) 1/4/2004 COLONOSCOPY 7/15/2015 MEDICARE YEARLY EXAM 3/14/2018 Influenza Age 5 to Adult 8/1/2018 Allergies as of 8/28/2018  Review Complete On: 8/28/2018 By: Dora oDckery NP Severity Noted Reaction Type Reactions Tagamet [Cimetidine]  05/10/2011    Unknown (comments) Current Immunizations  Never Reviewed No immunizations on file. Not reviewed this visit Vitals BP Pulse Height(growth percentile) Weight(growth percentile) SpO2 BMI  
 118/84 (BP 1 Location: Right arm, BP Patient Position: Sitting) 72 5' 2\" (1.575 m) 157 lb (71.2 kg) 98% 28.72 kg/m2 OB Status Smoking Status Postmenopausal Former Smoker Vitals History BMI and BSA Data Body Mass Index Body Surface Area 28.72 kg/m 2 1.76 m 2 Preferred Pharmacy Pharmacy Name Phone [facility] #[id number]- [address] Kramer [phone] Your Updated Medication List  
  
   
This list is accurate as of 8/28/18  3:09 PM.  Always use your most recent med list.  
  
  
  
  
 apixaban 5 mg tablet Commonly known as:  ELIQUIS  
TAKE 1 TABLET BY MOUTH TWICE DAILY  
  
 carvedilol 25 mg tablet Commonly known as:  COREG  
TAKE 1 TABLET BY MOUTH TWICE DAILY WITH MEALS  
  
 LOTEMAX 0.5 % ophthalmic suspension Generic drug:  loteprednol etabonate Administer 1 Drop to both eyes daily. OCUSOFT IRRIGATING OPHTH SOLN Drop Generic drug:  ophthalmic irrigation solution Apply 1 Drop to eye daily. Right eye  
  
 pioglitazone 15 mg tablet Commonly known as:  ACTOS Take 7.5 mg by mouth daily. REFRESH OPTIVE BAILEE-3 OP Apply  to eye as needed. RESTASIS 0.05 % ophthalmic emulsion Generic drug:  cycloSPORINE Administer 1 Drop to both eyes two (2) times a day. sodium chloride 5 % ophthalmic solution Commonly known as:  SUNI-5 Administer 1 Drop to right eye daily. spironolactone 25 mg tablet Commonly known as:  ALDACTONE Take 1 Tab by mouth daily. SYNTHROID 75 mcg tablet Generic drug:  levothyroxine Take  by mouth Daily (before breakfast). ZOCOR 20 mg tablet Generic drug:  simvastatin Take 20 mg by mouth nightly. Patient Instructions Continue present medication regimen Follow-up with Dr. Baylee Zapien as scheduled and as needed Introducing hospitals & HEALTH SERVICES! [facility] introduces [employer] patient portal. Now you can access parts of your medical record, email your doctor's office, and request medication refills online. 1. In your internet browser, go to [email or web address] 2. Click on the First Time User? Click Here link in the Sign In box. You will see the New Member Sign Up page. 3. Enter your [employer] Access Code exactly as it appears below. You will not need to use this code after youve completed the sign-up process. If you do not sign up before the expiration date, you must request a new code. · [employer] Access Code: [email or web address] Expires: 10/17/2018 10:09 AM 
 
4. Enter the last four digits of your Social Security Number (xxxx) and Date of Birth (mm/dd/yyyy) as indicated and click Submit. You will be taken to the next sign-up page. 5. Create a [employer] ID. This will be your [employer] login ID and cannot be changed, so think of one that is secure and easy to remember. 6. Create a [employer] password. You can change your password at any time. 7. Enter your Password Reset Question and Answer. This can be used at a later time if you forget your password. 8. Enter your e-mail address. You will receive e-mail notification when new information is available in [address] 9. Click Sign Up. You can now view and download portions of your medical record. 10. Click the Download Summary menu link to download a portable copy of your medical information. If you have questions, please visit the Frequently Asked Questions section of the [employer] website. Remember, [employer] is NOT to be used for urgent needs. For medical emergencies, dial 911. Now available from your iPhone and Android! Please provide this summary of care documentation to your next provider. Your primary care clinician is listed as [facility]. If you have any questions after today's visit, please call [phone].

## 2018-09-01 RX ORDER — APIXABAN 5 MG/1
TABLET, FILM COATED ORAL
Qty: 90 TAB | Refills: 3 | Status: SHIPPED | OUTPATIENT
Start: 2018-09-01 | End: 2019-03-13 | Stop reason: SDUPTHER

## 2018-10-20 DIAGNOSIS — I11.9 BENIGN HYPERTENSIVE HEART DISEASE WITHOUT HEART FAILURE: ICD-10-CM

## 2018-10-22 RX ORDER — SPIRONOLACTONE 25 MG/1
TABLET ORAL
Qty: 30 TAB | Refills: 6 | Status: SHIPPED | OUTPATIENT
Start: 2018-10-22 | End: 2019-03-14 | Stop reason: SDUPTHER

## 2018-10-31 DIAGNOSIS — I49.5 TACHYCARDIA-BRADYCARDIA SYNDROME (HCC): ICD-10-CM

## 2018-10-31 DIAGNOSIS — I48.19 PERSISTENT ATRIAL FIBRILLATION (HCC): ICD-10-CM

## 2018-10-31 RX ORDER — CARVEDILOL 25 MG/1
TABLET ORAL
Qty: 180 TAB | Refills: 3 | Status: SHIPPED | OUTPATIENT
Start: 2018-10-31 | End: 2019-10-01 | Stop reason: SDUPTHER

## 2018-11-19 ENCOUNTER — OFFICE VISIT (OUTPATIENT)
Dept: CARDIOLOGY CLINIC | Age: 78
End: 2018-11-19

## 2018-11-19 ENCOUNTER — CLINICAL SUPPORT (OUTPATIENT)
Dept: CARDIOLOGY CLINIC | Age: 78
End: 2018-11-19

## 2018-11-19 VITALS
HEART RATE: 84 BPM | WEIGHT: 150 LBS | OXYGEN SATURATION: 98 % | HEIGHT: 62 IN | SYSTOLIC BLOOD PRESSURE: 120 MMHG | DIASTOLIC BLOOD PRESSURE: 82 MMHG | BODY MASS INDEX: 27.6 KG/M2

## 2018-11-19 DIAGNOSIS — I48.20 CHRONIC ATRIAL FIBRILLATION (HCC): ICD-10-CM

## 2018-11-19 DIAGNOSIS — I25.10 ATHEROSCLEROSIS OF NATIVE CORONARY ARTERY OF NATIVE HEART WITHOUT ANGINA PECTORIS: ICD-10-CM

## 2018-11-19 DIAGNOSIS — Z95.0 PACEMAKER: ICD-10-CM

## 2018-11-19 DIAGNOSIS — R00.2 PALPITATION: Primary | ICD-10-CM

## 2018-11-19 DIAGNOSIS — I49.5 TACHYCARDIA-BRADYCARDIA SYNDROME (HCC): Primary | ICD-10-CM

## 2018-11-19 DIAGNOSIS — I49.5 TACHYCARDIA-BRADYCARDIA SYNDROME (HCC): ICD-10-CM

## 2018-11-19 DIAGNOSIS — H83.2X9 VESTIBULAR DYSFUNCTION, UNSPECIFIED LATERALITY: ICD-10-CM

## 2018-11-19 DIAGNOSIS — I11.9 BENIGN HYPERTENSIVE HEART DISEASE WITHOUT HEART FAILURE: ICD-10-CM

## 2018-11-19 DIAGNOSIS — I34.1 MITRAL VALVE PROLAPSE SYNDROME: ICD-10-CM

## 2018-11-19 RX ORDER — LEVOTHYROXINE SODIUM 88 UG/1
TABLET ORAL
COMMUNITY
End: 2020-07-30 | Stop reason: ALTCHOICE

## 2018-11-19 RX ORDER — PREDNISONE 5 MG/1
TABLET ORAL
COMMUNITY

## 2018-11-19 NOTE — PROGRESS NOTES
HISTORY OF PRESENT ILLNESS Vivienne Walls is a 77 y.o. female. HPI She has been feeling quite well lately since thyroid medication has been increased. She feels like she has more energy. Other than occasional palpitations, she has no complaints from a cardiac standpoint. She denies denies chest pain, dyspnea, orthopnea, PND. She denies dizziness or syncope. She denies any symptoms of TIA or amaurosis fugax. She has a history of palpitations and mild coronary artery disease. She had a cardiac catheterization on September 20, 1995, which demonstrated:   
1. Patent left main trunk. 2. Patent LAD. 3. Minor plaquing of the distal segment of the circumflex artery on a kink with less than 30% stenosis. 4. Patent nondominant right coronary artery with very mild diffuse disease.   
5. Normal left ventricle. She has had a mid to late systolic murmur at the apex with no click, and the diagnosis of mitral valve prolapse and mild mitral regurgitation was established clinically as well as by echocardiographic findings. She had a positive treadmill stress test but negative thallium findings.   
She has some spotting in her vision in the right eye, for which she has had multiple diagnostic procedures, including an MRI scan, and was told that she might have had an embolic stroke or a mild hemorrhage with the hypertension. She has been on Plavix since then. She had an adenosine Cardiolite myocardial perfusion scan on 02/22/05, at which time she had 2 mm ST depression on electrocardiogram with adenosine only, but the perfusion study was normal. The gated SPECT imaging revealed EF in the 70% range.  Because of the EKG abnormalities with the adenosine, the possibility of balanced ischemia with multi-vessel coronary disease was entertained and subsequently, she had a stress echocardiogram on 03/16/05, at which time she exercised 8 minutes and 41 seconds with no chest pain, but developed 2 mm of ST depression. There was no evidence of wall motion abnormalities in the presence of the ST depression and LV function remained normal. It was felt that the nuclear imaging and echocardiographic findings were normal with the EKG abnormalities. Therefore, there was no clear-cut clinical evidence of ischemia. She has been continued on medical treatment.   
She had an echocardiogram in January 2008 which demonstrated normal LV function with EF in the 70-75% range. There was mild to moderate mitral regurgitation. She also had an Adenosine Cardiolite myocardial perfusion scan which demonstrated normal perfusion imaging and normal LV function. She had repeat stress nuclear cardiac imaging on September 20, 2010 which demonstrated normal perfusion and normal LV function.   
She underwent stress nuclear cardiac imaging on 11/6/13 which demonstrated normal perfusion with no scaring or ischemia. She did have positive EKG changes without chest pain with exercise. The ejection fraction was estimated in the 80% range. She had followup stress nuclear cardiac imaging on 12/02/15, which demonstrated normal perfusion with no evidence of scarring or ischemia.  Ejection fraction was greater than 80% with a hyperdynamic ventricle.   
Because of the frequent bradycardia with pauses on the Holter monitor on 05/19/16, we reduced the betablocker with no improvement.   
She developed a racing heartbeat and was found to have atrial fibrillation when she was seen in the emergency room on 04/24/16. She was treated with atenolol 100 mg daily, which had to be reduced because of frequent pauses on the Holter monitor. But, a subsequent Holter monitor continued to reveal frequent long pauses with baseline atrial fibrillation. She subsequently underwent permanent pacemaker implantation on 05/31/16 with a dual chamber MRI compatible Medtronic pacemaker. Review of Systems Constitutional: Positive for weight loss. Negative for malaise/fatigue. HENT: Negative for hearing loss. Eyes: Negative for blurred vision and double vision. Respiratory: Negative for shortness of breath. Cardiovascular: Positive for palpitations. Negative for chest pain, orthopnea, claudication, leg swelling and PND. Gastrointestinal: Negative for blood in stool, heartburn and melena. Genitourinary: Negative for dysuria, frequency, hematuria and urgency. Musculoskeletal: Positive for myalgias. Negative for back pain and joint pain. Skin: Negative for itching and rash. Neurological: Negative for dizziness, loss of consciousness and weakness. Psychiatric/Behavioral: Negative for depression and memory loss. Physical Exam  
Constitutional: She is oriented to person, place, and time. She appears well-developed and well-nourished. HENT:  
Head: Normocephalic and atraumatic. Eyes: Conjunctivae are normal. Pupils are equal, round, and reactive to light. Neck: Normal range of motion. Neck supple. No JVD present. Cardiovascular: Normal rate, S1 normal and S2 normal. An irregularly irregular rhythm present. No extrasystoles are present. PMI is not displaced. Exam reveals no gallop and no friction rub. Murmur heard. High-pitched blowing mid to late systolic murmur is present with a grade of 1/6 at the apex. Pulses: 
     Carotid pulses are 3+ on the right side, and 3+ on the left side. Pulmonary/Chest: Effort normal. She has no rales. Abdominal: Soft. There is no tenderness. Musculoskeletal: She exhibits no edema. Neurological: She is alert and oriented to person, place, and time. No cranial nerve deficit. Skin: Skin is warm and dry. Psychiatric: She has a normal mood and affect. Her behavior is normal.  
 
Visit Vitals /82 Pulse 84 Ht 5' 2\" (1.575 m) Wt 68 kg (150 lb) SpO2 98% BMI 27.44 kg/m² Past Medical History:  
Diagnosis Date  History of echocardiogram 2008 EF 70-75%. Mild-mod MR. Mod TR. PASP 40 mmHg.  History of myocardial perfusion scan 2015 Low risk. No ischemia or prior infarction. EF >80%. No chg from study of 13.  Hypercholesterolemia  Hypertension  Mild coronary artery disease 95 Cath: Minor plaquing of the distal segment of the circumflex artery on a kink with less than 30% stenosis  Mitral valve prolapse syndrome   
 with mid to late systolic murmur with no click  Palpitations  S/P cardiac cath 1995 dCx 30%. Otherwise patent coronaries.  Stroke [facility])   
 possible small embolic Social History Socioeconomic History  Marital status:  Spouse name: Not on file  Number of children: Not on file  Years of education: Not on file  Highest education level: Not on file Social Needs  Financial resource strain: Not on file  Food insecurity - worry: Not on file  Food insecurity - inability: Not on file  Transportation needs - medical: Not on file  Transportation needs - non-medical: Not on file Occupational History  Not on file Tobacco Use  Smoking status: Former Smoker Packs/day: 1.00 Years: 13.00 Pack years: 13.00 Last attempt to quit: 5/10/1969 Years since quittin.5  Smokeless tobacco: Never Used Substance and Sexual Activity  Alcohol use: No  
 Drug use: No  
 Sexual activity: Not on file Other Topics Concern  Not on file Social History Narrative  Not on file Family History Problem Relation Age of Onset  Diabetes Mother  Hypertension Mother  High Cholesterol Mother  Stroke Mother  Cancer Father  Hypertension Brother  High Cholesterol Brother  Breast Cancer Maternal Aunt   
     x2, age 36 and 72 Past Surgical History:  
Procedure Laterality Date  HX ACL RECONSTRUCTION    
 left  HX CATARACT REMOVAL    
 bilateral  
 HX TOAN AND BSO  HX TONSILLECTOMY  JUNG BIOPSY BREAST STEREOTACTIC    
 left breast x 2 benign Current Outpatient Medications Medication Sig Dispense Refill  levothyroxine (SYNTHROID) 88 mcg tablet Take  by mouth Daily (before breakfast).  dapagliflozin (FARXIGA) 5 mg tab tablet Take  by mouth daily.  predniSONE (DELTASONE) 5 mg tablet Take  by mouth.  carvedilol (COREG) 25 mg tablet TAKE 1 TABLET BY MOUTH TWICE DAILY WITH MEALS 180 Tab 3  
 spironolactone (ALDACTONE) 25 mg tablet TAKE 1 TABLET BY MOUTH ONCE DAILY 30 Tab 6  
 ELIQUIS 5 mg tablet TAKE 1 TABLET BY MOUTH TWICE DAILY 90 Tab 3  carboxymethyl/gly/poly80/PF (REFRESH OPTIVE BAILEE-3 OP) Apply  to eye as needed.  sodium chloride (SUNI-5) 5 % ophthalmic solution Administer 1 Drop to right eye daily.  ophthalmic irrigation solution (OCUSOFT IRRIGATING OPHTH SOLN) drop Apply 1 Drop to eye daily. Right eye  pioglitazone (ACTOS) 15 mg tablet Take 7.5 mg by mouth daily.  loteprednol etabonate (LOTEMAX) 0.5 % ophthalmic suspension Administer 1 Drop to both eyes daily.  cycloSPORINE (RESTASIS) 0.05 % ophthalmic emulsion Administer 1 Drop to both eyes two (2) times a day.  simvastatin (ZOCOR) 20 mg tablet Take 20 mg by mouth nightly. EKG: unchanged from previous tracings, atrial fibrillation, rate controlled, occasional VVI pacer activities Kierra Chang ASSESSMENT and PLAN Encounter Diagnoses Name Primary?  Palpitation Yes  Chronic atrial fibrillation ([address]  Mitral valve prolapse syndrome with late systolic murmur.  Atherosclerosis of native coronary artery of native heart without angina pectoris  Tachycardia-bradycardia syndrome ([address]  Pacemaker  Hypertension  Vestibular dysfunction, unspecified laterality She has been doing reasonably well. She continues with occasional palpitation from the atrial fibrillation.  She has been stable with good rate control. She has had no symptoms to indicate angina or cardiac decompensation. Her heart rate seems to be under control. Her blood pressure has been under control for now. For now, she will be continued on current medical regimen.

## 2018-11-19 NOTE — PROGRESS NOTES
Reema Maynard presents today for Chief Complaint Patient presents with  Follow-up 6 month  Palpitations  
  racing and fluttering every night  Shortness of Breath  
  on exertion Reema Maynard preferred language for health care discussion is english/other. Is someone accompanying this pt? No 
 
Is the patient using any DME equipment during OV? NO Depression Screening: No flowsheet data found. Learning Assessment: 
Learning Assessment 5/20/2014 PRIMARY LEARNER Patient HIGHEST LEVEL OF EDUCATION - PRIMARY LEARNER  SOME COLLEGE  
BARRIERS PRIMARY LEARNER NONE  
CO-LEARNER CAREGIVER No  
PRIMARY LANGUAGE ENGLISH  NEED No  
LEARNER PREFERENCE PRIMARY DEMONSTRATION  
  VIDEOS  
ANSWERED BY patient RELATIONSHIP SELF Abuse Screening: No flowsheet data found. Fall Risk No flowsheet data found. Pt currently taking Antiplatelet therapy? Eliquis Coordination of Care: 1. Have you been to the ER, urgent care clinic since your last visit? Hospitalized since your last visit? No 
 
2. Have you seen or consulted any other health care providers outside of the [address] [place] [address] since your last visit? Include any pap smears or colon screening.  No

## 2018-11-27 NOTE — PROGRESS NOTES
I have personally seen and evaluated the device findings. Interrogation reviewed and I agree with assessment. Kobe Marquez

## 2019-03-13 RX ORDER — APIXABAN 5 MG/1
TABLET, FILM COATED ORAL
Qty: 90 TAB | Refills: 3 | Status: SHIPPED | OUTPATIENT
Start: 2019-03-13 | End: 2019-09-08 | Stop reason: SDUPTHER

## 2019-03-14 DIAGNOSIS — I11.9 BENIGN HYPERTENSIVE HEART DISEASE WITHOUT HEART FAILURE: ICD-10-CM

## 2019-03-14 RX ORDER — SPIRONOLACTONE 25 MG/1
TABLET ORAL
Qty: 30 TAB | Refills: 6 | Status: SHIPPED | OUTPATIENT
Start: 2019-03-14 | End: 2020-01-03

## 2019-04-22 ENCOUNTER — HOSPITAL ENCOUNTER (OUTPATIENT)
Dept: NON INVASIVE DIAGNOSTICS | Age: 79
Discharge: HOME | End: 2019-04-22
Attending: INTERNAL MEDICINE
Payer: MEDICARE

## 2019-04-22 VITALS
HEIGHT: 62 IN | WEIGHT: 150 LBS | BODY MASS INDEX: 27.6 KG/M2 | DIASTOLIC BLOOD PRESSURE: 87 MMHG | SYSTOLIC BLOOD PRESSURE: 137 MMHG

## 2019-04-22 DIAGNOSIS — R06.02 SHORTNESS OF BREATH: ICD-10-CM

## 2019-04-22 LAB
STRESS BASELINE HR: 68 BPM
STRESS ESTIMATED WORKLOAD: 1 METS
STRESS EXERCISE DUR MIN: NORMAL
STRESS PEAK DIAS BP: 87 MMHG
STRESS PEAK SYS BP: 137 MMHG
STRESS PERCENT HR ACHIEVED: 69 %
STRESS POST PEAK HR: 97 BPM
STRESS RATE PRESSURE PRODUCT: NORMAL BPM*MMHG
STRESS TARGET HR: 140 BPM

## 2019-04-22 PROCEDURE — 74011250636 HC RX REV CODE- 250/636

## 2019-04-22 PROCEDURE — 93017 CV STRESS TEST TRACING ONLY: CPT

## 2019-04-22 RX ORDER — SODIUM CHLORIDE 9 MG/ML
250 INJECTION, SOLUTION INTRAVENOUS ONCE
Status: COMPLETED | OUTPATIENT
Start: 2019-04-22 | End: 2019-04-22

## 2019-04-22 RX ADMIN — REGADENOSON 0.4 MG: 0.08 INJECTION, SOLUTION INTRAVENOUS at 11:20

## 2019-04-22 RX ADMIN — SODIUM CHLORIDE 250 ML: 900 INJECTION, SOLUTION INTRAVENOUS at 11:20

## 2019-04-22 NOTE — PROGRESS NOTES
Patient was given 10.1 mCi of Sestamibi for the Resting pictures. Patient received 0.4 mg of Lexiscan for the exercise portion of the Stress test. Patient was then given 32 mCi of Sestamibi for the Stress pictures. Armband was removed and disposed of before the patient left.

## 2019-05-20 ENCOUNTER — CLINICAL SUPPORT (OUTPATIENT)
Dept: CARDIOLOGY CLINIC | Age: 79
End: 2019-05-20

## 2019-05-20 ENCOUNTER — OFFICE VISIT (OUTPATIENT)
Dept: CARDIOLOGY CLINIC | Age: 79
End: 2019-05-20

## 2019-05-20 VITALS
BODY MASS INDEX: 26.31 KG/M2 | OXYGEN SATURATION: 98 % | HEIGHT: 62 IN | WEIGHT: 143 LBS | DIASTOLIC BLOOD PRESSURE: 82 MMHG | SYSTOLIC BLOOD PRESSURE: 120 MMHG | HEART RATE: 80 BPM

## 2019-05-20 DIAGNOSIS — H83.2X9 VESTIBULAR DYSFUNCTION, UNSPECIFIED LATERALITY: ICD-10-CM

## 2019-05-20 DIAGNOSIS — I48.20 CHRONIC ATRIAL FIBRILLATION (HCC): ICD-10-CM

## 2019-05-20 DIAGNOSIS — I34.1 MITRAL VALVE PROLAPSE SYNDROME: ICD-10-CM

## 2019-05-20 DIAGNOSIS — R06.09 DOE (DYSPNEA ON EXERTION): Primary | ICD-10-CM

## 2019-05-20 DIAGNOSIS — I49.5 TACHYCARDIA-BRADYCARDIA SYNDROME (HCC): ICD-10-CM

## 2019-05-20 DIAGNOSIS — Z95.0 PACEMAKER: ICD-10-CM

## 2019-05-20 DIAGNOSIS — I25.10 ATHEROSCLEROSIS OF NATIVE CORONARY ARTERY OF NATIVE HEART WITHOUT ANGINA PECTORIS: ICD-10-CM

## 2019-05-20 DIAGNOSIS — R00.2 PALPITATION: ICD-10-CM

## 2019-05-20 DIAGNOSIS — I48.20 CHRONIC ATRIAL FIBRILLATION (HCC): Primary | ICD-10-CM

## 2019-05-20 DIAGNOSIS — I11.9 BENIGN HYPERTENSIVE HEART DISEASE WITHOUT HEART FAILURE: ICD-10-CM

## 2019-05-20 RX ORDER — GLUCOSAMINE SULFATE 1500 MG
POWDER IN PACKET (EA) ORAL DAILY
COMMUNITY
End: 2022-09-15

## 2019-05-20 RX ORDER — REPAGLINIDE 2 MG/1
2 TABLET ORAL
COMMUNITY

## 2019-05-20 NOTE — PROGRESS NOTES
I have personally seen and evaluated the device findings. Interrogation reviewed and I agree with assessment. Reese Graham

## 2019-05-20 NOTE — PROGRESS NOTES
Ann Milian presents today for Chief Complaint Patient presents with  Follow-up 6 month   
 
 
Ann Milian preferred language for health care discussion is english/other. Is someone accompanying this pt? no 
 
Is the patient using any DME equipment during [address]? no 
 
Depression Screening: 
3 most recent PHQ Screens 5/20/2019 Little interest or pleasure in doing things Not at all Feeling down, depressed, irritable, or hopeless Not at all Total Score PHQ 2 0 Learning Assessment: 
Learning Assessment 5/20/2014 PRIMARY LEARNER Patient HIGHEST LEVEL OF EDUCATION - PRIMARY LEARNER  SOME COLLEGE  
BARRIERS PRIMARY LEARNER NONE  
CO-LEARNER CAREGIVER No  
PRIMARY LANGUAGE ENGLISH  NEED No  
LEARNER PREFERENCE PRIMARY DEMONSTRATION  
  VIDEOS  
ANSWERED BY patient RELATIONSHIP SELF Abuse Screening: No flowsheet data found. Fall Risk Fall Risk Assessment, last 12 mths 5/20/2019 Able to walk? Yes Fall in past 12 months? No  
 
 
Pt currently taking Anticoagulant therapy? yes Coordination of Care: 1. Have you been to the ER, urgent care clinic since your last visit? Hospitalized since your last visit? no 
 
2. Have you seen or consulted any other health care providers outside of the [address] [place] [address] since your last visit? Include any pap smears or colon screening.  no

## 2019-05-20 NOTE — PROGRESS NOTES
HISTORY OF PRESENT ILLNESS  Kristina Burris is a [de-identified] y.o. female. HPI  She is mourning the death of her  of 62 years. She is complaining of worsening dyspnea on exertion. She has had no chest pain, resting dyspnea, orthopnea, PND. She has had very little if any palpitation despite atrial fibrillation. She has had no dizziness or syncope. She has had no symptoms to indicate TIA or amaurosis fugax. She has not been doing much exercise. Her activity involves walking to the mailbox. She underwent stress nuclear cardiac imaging on 4/22/19 which demonstrated normal perfusion with no evidence of scarring or ischemia. EF was estimated at 71%. She has a history of palpitations and mild coronary artery disease. She had a cardiac catheterization on September 20, 1995, which demonstrated:    1. Patent left main trunk. 2. Patent LAD. 3. Minor plaquing of the distal segment of the circumflex artery on a kink with less than 30% stenosis. 4. Patent nondominant right coronary artery with very mild diffuse disease.    5. Normal left ventricle. She has had a mid to late systolic murmur at the apex with no click, and the diagnosis of mitral valve prolapse and mild mitral regurgitation was established clinically as well as by echocardiographic findings. She had a positive treadmill stress test but negative thallium findings.    She has some spotting in her vision in the right eye, for which she has had multiple diagnostic procedures, including an MRI scan, and was told that she might have had an embolic stroke or a mild hemorrhage with the hypertension. She has been on Plavix since then. She had an adenosine Cardiolite myocardial perfusion scan on 02/22/05, at which time she had 2 mm ST depression on electrocardiogram with adenosine only, but the perfusion study was normal. The gated SPECT imaging revealed EF in the 70% range.  Because of the EKG abnormalities with the adenosine, the possibility of balanced ischemia with multi-vessel coronary disease was entertained and subsequently, she had a stress echocardiogram on 03/16/05, at which time she exercised 8 minutes and 41 seconds with no chest pain, but developed 2 mm of ST depression. There was no evidence of wall motion abnormalities in the presence of the ST depression and LV function remained normal. It was felt that the nuclear imaging and echocardiographic findings were normal with the EKG abnormalities. Therefore, there was no clear-cut clinical evidence of ischemia. She has been continued on medical treatment.    She had an echocardiogram in January 2008 which demonstrated normal LV function with EF in the 70-75% range. There was mild to moderate mitral regurgitation. She also had an Adenosine Cardiolite myocardial perfusion scan which demonstrated normal perfusion imaging and normal LV function. She had repeat stress nuclear cardiac imaging on September 20, 2010 which demonstrated normal perfusion and normal LV function.    She underwent stress nuclear cardiac imaging on 11/6/13 which demonstrated normal perfusion with no scaring or ischemia. She did have positive EKG changes without chest pain with exercise. The ejection fraction was estimated in the 80% range. She had followup stress nuclear cardiac imaging on 12/02/15, which demonstrated normal perfusion with no evidence of scarring or ischemia.  Ejection fraction was greater than 80% with a hyperdynamic ventricle.    Because of the frequent bradycardia with pauses on the Holter monitor on 05/19/16, we reduced the betablocker with no improvement.    She developed a racing heartbeat and was found to have atrial fibrillation when she was seen in the emergency room on 04/24/16. She was treated with atenolol 100 mg daily, which had to be reduced because of frequent pauses on the Holter monitor. But, a subsequent Holter monitor continued to reveal frequent long pauses with baseline atrial fibrillation.    She subsequently underwent permanent pacemaker implantation on 05/31/16 with a dual chamber MRI compatible Medtronic pacemaker.      Review of Systems   Constitutional: Positive for weight loss. Negative for malaise/fatigue. HENT: Negative for hearing loss. Eyes: Negative for blurred vision and double vision. Respiratory: Negative for shortness of breath. Cardiovascular: Negative for chest pain, palpitations, orthopnea, claudication, leg swelling and PND. Gastrointestinal: Negative for blood in stool, heartburn and melena. Genitourinary: Negative for dysuria, frequency, hematuria and urgency. Musculoskeletal: Negative for back pain and joint pain. Skin: Negative for itching and rash. Neurological: Negative for dizziness and loss of consciousness. Psychiatric/Behavioral: Negative for depression and memory loss. Physical Exam   Constitutional: She is oriented to person, place, and time. She appears well-developed and well-nourished. HENT:   Head: Normocephalic and atraumatic. Eyes: Pupils are equal, round, and reactive to light. Conjunctivae are normal.   Neck: Normal range of motion. Neck supple. No JVD present. Cardiovascular: Normal rate, S1 normal and S2 normal. An irregularly irregular rhythm present. No extrasystoles are present. PMI is not displaced. Exam reveals no gallop and no friction rub. Murmur heard. High-pitched blowing mid to late systolic murmur is present with a grade of 1/6 at the apex. Pulses:       Carotid pulses are 3+ on the right side, and 3+ on the left side. Pulmonary/Chest: Effort normal. She has no rales. Abdominal: Soft. There is no tenderness. Musculoskeletal: She exhibits no edema. Neurological: She is alert and oriented to person, place, and time. No cranial nerve deficit. Skin: Skin is warm and dry. Psychiatric: She has a normal mood and affect.  Her behavior is normal.     Visit Vitals  /82   Pulse 80   Ht 5' 2\" (1.575 m)   Wt 64.9 kg (143 lb)   SpO2 98%   BMI 26.16 kg/m²       Past Medical History:   Diagnosis Date    History of echocardiogram 2008    EF 70-75%. Mild-mod MR. Mod TR. PASP 40 mmHg.  History of myocardial perfusion scan 2015    Low risk. No ischemia or prior infarction. EF >80%. No chg from study of 13.  Hypercholesterolemia     Hypertension     Mild coronary artery disease 95    Cath: Minor plaquing of the distal segment of the circumflex artery on a kink with less than 30% stenosis    Mitral valve prolapse syndrome     with mid to late systolic murmur with no click    Palpitations     S/P cardiac cath 1995    dCx 30%. Otherwise patent coronaries.     Stroke [facility])     possible small embolic       Social History     Socioeconomic History    Marital status:      Spouse name: Not on file    Number of children: Not on file    Years of education: Not on file    Highest education level: Not on file   Occupational History    Not on file   Social Needs    Financial resource strain: Not on file    Food insecurity:     Worry: Not on file     Inability: Not on file    Transportation needs:     Medical: Not on file     Non-medical: Not on file   Tobacco Use    Smoking status: Former Smoker     Packs/day: 1.00     Years: 13.00     Pack years: 13.00     Last attempt to quit: 5/10/1969     Years since quittin.0    Smokeless tobacco: Never Used   Substance and Sexual Activity    Alcohol use: No    Drug use: No    Sexual activity: Not on file   Lifestyle    Physical activity:     Days per week: Not on file     Minutes per session: Not on file    Stress: Not on file   Relationships    Social connections:     Talks on phone: Not on file     Gets together: Not on file     Attends [employer] service: Not on file     Active member of club or organization: Not on file     Attends meetings of clubs or organizations: Not on file     Relationship status: Not on file    Intimate partner violence:     Fear of current or ex partner: Not on file     Emotionally abused: Not on file     Physically abused: Not on file     Forced sexual activity: Not on file   Other Topics Concern    Not on file   Social History Narrative    Not on file       Family History   Problem Relation Age of Onset    Diabetes Mother     Hypertension Mother     High Cholesterol Mother     Stroke Mother     Cancer Father     Hypertension Brother     High Cholesterol Brother     Breast Cancer Maternal Aunt         x2, age 36 and 72       Past Surgical History:   Procedure Laterality Date    HX ACL RECONSTRUCTION      left    HX CATARACT REMOVAL      bilateral    HX TOAN AND BSO      HX TONSILLECTOMY      JUNG BIOPSY BREAST STEREOTACTIC      left breast x 2 benign       Current Outpatient Medications   Medication Sig Dispense Refill    repaglinide (PRANDIN) 2 mg tablet Take 2 mg by mouth Before breakfast, lunch, and dinner.  cholecalciferol (VITAMIN D3) 1,000 unit cap Take  by mouth daily.  spironolactone (ALDACTONE) 25 mg tablet Take 1 tablet by mouth once daily 30 Tab 6    ELIQUIS 5 mg tablet TAKE 1 TABLET BY MOUTH TWICE DAILY 90 Tab 3    levothyroxine (SYNTHROID) 88 mcg tablet Take  by mouth Daily (before breakfast).  predniSONE (DELTASONE) 5 mg tablet Take  by mouth.  carvedilol (COREG) 25 mg tablet TAKE 1 TABLET BY MOUTH TWICE DAILY WITH MEALS 180 Tab 3    carboxymethyl/gly/poly80/PF (REFRESH OPTIVE BAILEE-3 OP) Apply  to eye as needed.  sodium chloride (SUNI-5) 5 % ophthalmic solution Administer 1 Drop to right eye daily.  ophthalmic irrigation solution (OCUSOFT IRRIGATING OPHTH SOLN) drop Apply 1 Drop to eye daily. Right eye      pioglitazone (ACTOS) 15 mg tablet Take 30 mg by mouth daily.  loteprednol etabonate (LOTEMAX) 0.5 % ophthalmic suspension Administer 1 Drop to both eyes daily.       cycloSPORINE (RESTASIS) 0.05 % ophthalmic emulsion Administer 1 Drop to both eyes two (2) times a day.  simvastatin (ZOCOR) 20 mg tablet Take 20 mg by mouth nightly. EKG: unchanged from previous tracings, atrial fibrillation, rate controlled, occ. VVI pacing activities  . ASSESSMENT and PLAN  Encounter Diagnoses   Name Primary?  GOMEZ (dyspnea on exertion) Yes    Palpitation     Chronic atrial fibrillation (HCC)     Mitral valve prolapse syndrome with late systolic murmur.  Atherosclerosis of native coronary artery of native heart without angina pectoris     Tachycardia-bradycardia syndrome ([address]     Pacemaker     Hypertension     Vestibular dysfunction, unspecified laterality    Her dyspnea on exertion appears to be secondary to deconditioning or left ventricular diastolic dysfunction. The atrial fibrillation superimposed upon noncompliant left ventricle certainly contributes to her dyspnea on exertion. She was given advice to start walking as an exercise. She has had no symptoms to indicate angina and recently had a negative stress nuclear cardiac imaging. Her atrial fibrillation has been stable with good rate control and she is essentially asymptomatic from the atrial fibrillation. It should be noted that her ejection fraction was 71% by gated SPECT imaging at the time of the stress test recently.

## 2019-08-30 ENCOUNTER — HOSPITAL ENCOUNTER (OUTPATIENT)
Dept: CT IMAGING | Age: 79
Discharge: HOME | End: 2019-08-30
Attending: INTERNAL MEDICINE
Payer: MEDICARE

## 2019-08-30 DIAGNOSIS — R10.9 ABDOMINAL PAIN: ICD-10-CM

## 2019-08-30 LAB — CREAT UR-MCNC: 0.9 MG/DL (ref 0.6–1.3)

## 2019-08-30 PROCEDURE — 74177 CT ABD & PELVIS W/CONTRAST: CPT

## 2019-08-30 PROCEDURE — 74011636320 HC RX REV CODE- 636/320

## 2019-08-30 PROCEDURE — 82565 ASSAY OF CREATININE: CPT

## 2019-08-30 RX ADMIN — IOPAMIDOL 100 ML: 612 INJECTION, SOLUTION INTRAVENOUS at 09:09

## 2019-09-09 RX ORDER — APIXABAN 5 MG/1
TABLET, FILM COATED ORAL
Qty: 60 TAB | Refills: 5 | Status: SHIPPED | OUTPATIENT
Start: 2019-09-09 | End: 2020-02-25

## 2019-09-30 ENCOUNTER — HOSPITAL ENCOUNTER (OUTPATIENT)
Dept: RESPIRATORY THERAPY | Age: 79
Discharge: HOME | End: 2019-09-30
Attending: INTERNAL MEDICINE
Payer: MEDICARE

## 2019-09-30 DIAGNOSIS — R06.02 SHORTNESS OF BREATH: ICD-10-CM

## 2019-09-30 PROCEDURE — 94727 GAS DIL/WSHOT DETER LNG VOL: CPT

## 2019-09-30 PROCEDURE — 94060 EVALUATION OF WHEEZING: CPT

## 2019-09-30 PROCEDURE — 94729 DIFFUSING CAPACITY: CPT

## 2019-10-01 DIAGNOSIS — I48.19 PERSISTENT ATRIAL FIBRILLATION (HCC): ICD-10-CM

## 2019-10-01 DIAGNOSIS — I49.5 TACHYCARDIA-BRADYCARDIA SYNDROME (HCC): ICD-10-CM

## 2019-10-03 RX ORDER — CARVEDILOL 25 MG/1
TABLET ORAL
Qty: 180 TAB | Refills: 3 | Status: SHIPPED | OUTPATIENT
Start: 2019-10-03 | End: 2020-08-17

## 2019-12-31 ENCOUNTER — CLINICAL SUPPORT (OUTPATIENT)
Dept: CARDIOLOGY CLINIC | Age: 79
End: 2019-12-31

## 2019-12-31 ENCOUNTER — OFFICE VISIT (OUTPATIENT)
Dept: CARDIOLOGY CLINIC | Age: 79
End: 2019-12-31

## 2019-12-31 VITALS
DIASTOLIC BLOOD PRESSURE: 64 MMHG | HEART RATE: 70 BPM | WEIGHT: 143 LBS | BODY MASS INDEX: 26.31 KG/M2 | SYSTOLIC BLOOD PRESSURE: 108 MMHG | OXYGEN SATURATION: 95 % | HEIGHT: 62 IN

## 2019-12-31 DIAGNOSIS — I48.20 CHRONIC ATRIAL FIBRILLATION (HCC): Primary | ICD-10-CM

## 2019-12-31 DIAGNOSIS — H83.2X9 VESTIBULAR DYSFUNCTION, UNSPECIFIED LATERALITY: ICD-10-CM

## 2019-12-31 DIAGNOSIS — R06.09 DOE (DYSPNEA ON EXERTION): ICD-10-CM

## 2019-12-31 DIAGNOSIS — Z95.0 PACEMAKER: ICD-10-CM

## 2019-12-31 DIAGNOSIS — R00.2 PALPITATION: ICD-10-CM

## 2019-12-31 DIAGNOSIS — I34.1 MITRAL VALVE PROLAPSE SYNDROME: ICD-10-CM

## 2019-12-31 DIAGNOSIS — I25.10 ATHEROSCLEROSIS OF NATIVE CORONARY ARTERY OF NATIVE HEART WITHOUT ANGINA PECTORIS: ICD-10-CM

## 2019-12-31 DIAGNOSIS — I49.5 TACHYCARDIA-BRADYCARDIA SYNDROME (HCC): ICD-10-CM

## 2019-12-31 DIAGNOSIS — I11.9 BENIGN HYPERTENSIVE HEART DISEASE WITHOUT HEART FAILURE: ICD-10-CM

## 2019-12-31 RX ORDER — DOCUSATE SODIUM 100 MG/1
100 CAPSULE, LIQUID FILLED ORAL 2 TIMES DAILY
COMMUNITY
End: 2022-03-23

## 2019-12-31 RX ORDER — ASCORBIC ACID 500 MG
500 TABLET ORAL DAILY
COMMUNITY
End: 2022-09-15

## 2019-12-31 RX ORDER — LANOLIN ALCOHOL/MO/W.PET/CERES
1000 CREAM (GRAM) TOPICAL DAILY
COMMUNITY
End: 2021-09-22

## 2019-12-31 RX ORDER — VITAMIN E CAP 100 UNIT 100 UNIT
CAP ORAL DAILY
COMMUNITY
End: 2021-09-22

## 2019-12-31 RX ORDER — MICONAZOLE NITRATE 2 %
2 CREAM WITH APPLICATOR VAGINAL DAILY
COMMUNITY
End: 2022-09-15

## 2019-12-31 NOTE — PROGRESS NOTES
HISTORY OF PRESENT ILLNESS  Enrico Clark is a [de-identified] y.o. female. HPI  She has been feeling reasonably well. She does have mild chronic dyspnea on exertion which has not changed much. She has no resting dyspnea, orthopnea, PND. She denies chest pain. She has had occasional palpitation but not frequently. She has had no dizziness or syncope. She has had no symptoms to indicate TIA or amaurosis fugax. She has a history of palpitations and mild coronary artery disease. She had a cardiac catheterization on September 20, 1995, which demonstrated:    1. Patent left main trunk. 2. Patent LAD. 3. Minor plaquing of the distal segment of the circumflex artery on a kink with less than 30% stenosis. 4. Patent nondominant right coronary artery with very mild diffuse disease.    5. Normal left ventricle. She has had a mid to late systolic murmur at the apex with no click, and the diagnosis of mitral valve prolapse and mild mitral regurgitation was established clinically as well as by echocardiographic findings. She had a positive treadmill stress test but negative thallium findings.    She has some spotting in her vision in the right eye, for which she has had multiple diagnostic procedures, including an MRI scan, and was told that she might have had an embolic stroke or a mild hemorrhage with the hypertension. She has been on Plavix since then. She had an adenosine Cardiolite myocardial perfusion scan on 02/22/05, at which time she had 2 mm ST depression on electrocardiogram with adenosine only, but the perfusion study was normal. The gated SPECT imaging revealed EF in the 70% range. Because of the EKG abnormalities with the adenosine, the possibility of balanced ischemia with multi-vessel coronary disease was entertained and subsequently, she had a stress echocardiogram on 03/16/05, at which time she exercised 8 minutes and 41 seconds with no chest pain, but developed 2 mm of ST depression.  There was no evidence of wall motion abnormalities in the presence of the ST depression and LV function remained normal. It was felt that the nuclear imaging and echocardiographic findings were normal with the EKG abnormalities. Therefore, there was no clear-cut clinical evidence of ischemia. She has been continued on medical treatment.    She had an echocardiogram in January 2008 which demonstrated normal LV function with EF in the 70-75% range. There was mild to moderate mitral regurgitation. She also had an Adenosine Cardiolite myocardial perfusion scan which demonstrated normal perfusion imaging and normal LV function. She had repeat stress nuclear cardiac imaging on September 20, 2010 which demonstrated normal perfusion and normal LV function.    She underwent stress nuclear cardiac imaging on 11/6/13 which demonstrated normal perfusion with no scaring or ischemia. She did have positive EKG changes without chest pain with exercise. The ejection fraction was estimated in the 80% range. She had followup stress nuclear cardiac imaging on 12/02/15, which demonstrated normal perfusion with no evidence of scarring or ischemia.  Ejection fraction was greater than 80% with a hyperdynamic ventricle.    Because of the frequent bradycardia with pauses on the Holter monitor on 05/19/16, we reduced the betablocker with no improvement.    She developed a racing heartbeat and was found to have atrial fibrillation when she was seen in the emergency room on 04/24/16. She was treated with atenolol 100 mg daily, which had to be reduced because of frequent pauses on the Holter monitor. But, a subsequent Holter monitor continued to reveal frequent long pauses with baseline atrial fibrillation. She subsequently underwent permanent pacemaker implantation on 05/31/16 with a dual chamber MRI compatible Medtronic pacemaker.    She underwent stress nuclear cardiac imaging on 4/22/19 which demonstrated normal perfusion with no evidence of scarring or ischemia. EF was estimated at 71%. Review of Systems   Constitutional: Positive for malaise/fatigue. Negative for weight loss. HENT: Negative for hearing loss. Eyes: Negative for blurred vision and double vision. Respiratory: Positive for shortness of breath. Cardiovascular: Negative for chest pain, palpitations, orthopnea, claudication, leg swelling and PND. Gastrointestinal: Negative for blood in stool, heartburn and melena. Genitourinary: Negative for dysuria, frequency, hematuria and urgency. Musculoskeletal: Negative for back pain and joint pain. Skin: Negative for itching and rash. Neurological: Positive for dizziness. Negative for loss of consciousness. Psychiatric/Behavioral: Negative for depression and memory loss. Physical Exam   Constitutional: She is oriented to person, place, and time. She appears well-developed and well-nourished. HENT:   Head: Normocephalic and atraumatic. Eyes: Pupils are equal, round, and reactive to light. Conjunctivae are normal.   Neck: Normal range of motion. Neck supple. No JVD present. Cardiovascular: Normal rate, S1 normal and S2 normal. An irregularly irregular rhythm present. No extrasystoles are present. PMI is not displaced. Exam reveals no gallop and no friction rub. Murmur heard. High-pitched blowing mid to late systolic murmur is present with a grade of 1/6 at the apex. Pulses:       Carotid pulses are 3+ on the right side and 3+ on the left side. Pulmonary/Chest: Effort normal. She has no rales. Abdominal: Soft. There is no tenderness. Musculoskeletal:         General: No edema. Neurological: She is alert and oriented to person, place, and time. No cranial nerve deficit. Skin: Skin is warm and dry. Psychiatric: She has a normal mood and affect.  Her behavior is normal.     Visit Vitals  /64 (BP 1 Location: Left arm, BP Patient Position: Sitting)   Pulse 70   Ht 5' 2\" (1.575 m)   Wt 64.9 kg (143 lb)   SpO2 95%   BMI 26.16 kg/m²       Past Medical History:   Diagnosis Date    History of echocardiogram 2008    EF 70-75%. Mild-mod MR. Mod TR. PASP 40 mmHg.  History of myocardial perfusion scan 2015    Low risk. No ischemia or prior infarction. EF >80%. No chg from study of 13.  Hypercholesterolemia     Hypertension     Mild coronary artery disease 95    Cath: Minor plaquing of the distal segment of the circumflex artery on a kink with less than 30% stenosis    Mitral valve prolapse syndrome     with mid to late systolic murmur with no click    Palpitations     S/P cardiac cath 1995    dCx 30%. Otherwise patent coronaries.     Stroke [facility])     possible small embolic       Social History     Socioeconomic History    Marital status:      Spouse name: Not on file    Number of children: Not on file    Years of education: Not on file    Highest education level: Not on file   Occupational History    Not on file   Social Needs    Financial resource strain: Not on file    Food insecurity:     Worry: Not on file     Inability: Not on file    Transportation needs:     Medical: Not on file     Non-medical: Not on file   Tobacco Use    Smoking status: Former Smoker     Packs/day: 1.00     Years: 13.00     Pack years: 13.00     Last attempt to quit: 5/10/1969     Years since quittin.6    Smokeless tobacco: Never Used   Substance and Sexual Activity    Alcohol use: No    Drug use: No    Sexual activity: Not on file   Lifestyle    Physical activity:     Days per week: Not on file     Minutes per session: Not on file    Stress: Not on file   Relationships    Social connections:     Talks on phone: Not on file     Gets together: Not on file     Attends [employer] service: Not on file     Active member of club or organization: Not on file     Attends meetings of clubs or organizations: Not on file     Relationship status: Not on file    Intimate partner violence:     Fear of current or ex partner: Not on file     Emotionally abused: Not on file     Physically abused: Not on file     Forced sexual activity: Not on file   Other Topics Concern    Not on file   Social History Narrative    Not on file       Family History   Problem Relation Age of Onset    Diabetes Mother     Hypertension Mother     High Cholesterol Mother     Stroke Mother     Cancer Father     Hypertension Brother     High Cholesterol Brother     Breast Cancer Maternal Aunt         x2, age 36 and 72       Past Surgical History:   Procedure Laterality Date    HX ACL RECONSTRUCTION      left    HX CATARACT REMOVAL      bilateral    HX TOAN AND BSO      HX TONSILLECTOMY      JUNG BIOPSY BREAST STEREOTACTIC      left breast x 2 benign       Current Outpatient Medications   Medication Sig Dispense Refill    magnesium chloride (SLOW-MAG) 71.5 mg tablet Take 143 mg by mouth daily.  cyanocobalamin (VITAMIN B12) 500 mcg tablet Take 1,000 mcg by mouth daily.  docusate sodium (COLACE) 100 mg capsule Take 100 mg by mouth two (2) times a day.  calcium citrate-vitamin D3 (CITRACAL WITH VITAMIN D MAXIMUM) tablet Take 2 Tabs by mouth daily.  brimonidine (MIRVASO) 0.33 % gel by Apply Externally route daily.  fish oil-omega-3 fatty acids 340-1,000 mg capsule Take 2 Caps by mouth daily.  vitamin e (E GEMS) 100 unit capsule Take  by mouth daily.  ascorbic acid, vitamin C, (VITAMIN C) 500 mg tablet Take 500 mg by mouth daily.  carvedilol (COREG) 25 mg tablet TAKE 1 TABLET BY MOUTH TWICE A DAY WITH MEALS 180 Tab 3    ELIQUIS 5 mg tablet TAKE 1 TABLET BY MOUTH TWICE A DAY 60 Tab 5    repaglinide (PRANDIN) 2 mg tablet Take 2 mg by mouth Before breakfast, lunch, and dinner.  cholecalciferol (VITAMIN D3) 1,000 unit cap Take  by mouth daily.       spironolactone (ALDACTONE) 25 mg tablet Take 1 tablet by mouth once daily 30 Tab 6    levothyroxine (SYNTHROID) 88 mcg tablet Take  by mouth Daily (before breakfast).  predniSONE (DELTASONE) 5 mg tablet Take  by mouth.  carboxymethyl/gly/poly80/PF (REFRESH OPTIVE BAILEE-3 OP) Apply  to eye as needed.  sodium chloride (SUNI-5) 5 % ophthalmic solution Administer 1 Drop to right eye daily.  ophthalmic irrigation solution (OCUSOFT IRRIGATING OPHTH SOLN) drop Apply 1 Drop to eye daily. Right eye      pioglitazone (ACTOS) 15 mg tablet Take 30 mg by mouth daily.  loteprednol etabonate (LOTEMAX) 0.5 % ophthalmic suspension Administer 1 Drop to both eyes daily.  cycloSPORINE (RESTASIS) 0.05 % ophthalmic emulsion Administer 1 Drop to both eyes two (2) times a day.  simvastatin (ZOCOR) 20 mg tablet Take 20 mg by mouth nightly. EKG: unchanged from previous tracings, nonspecific ST and T waves changes, atrial fibrillation, rate controlled, occa. VVI pacing  . ASSESSMENT and PLAN  Encounter Diagnoses   Name Primary?  Chronic atrial fibrillation Yes    Atherosclerosis of native coronary artery of native heart without angina pectoris     GOMEZ (dyspnea on exertion)     Palpitation     Mitral valve prolapse syndrome with late systolic murmur.  Tachycardia-bradycardia syndrome ([address]     Pacemaker     Hypertension     Vestibular dysfunction, unspecified laterality    She has been doing quite well. She is lonesome. She has had no symptoms to indicate angina or cardiac decompensation. Her atrial fibrillation has been stable with good rate control. For now, she will continue on current medical regimen.

## 2020-01-02 DIAGNOSIS — I11.9 BENIGN HYPERTENSIVE HEART DISEASE WITHOUT HEART FAILURE: ICD-10-CM

## 2020-01-03 DIAGNOSIS — I11.9 BENIGN HYPERTENSIVE HEART DISEASE WITHOUT HEART FAILURE: ICD-10-CM

## 2020-01-03 RX ORDER — SPIRONOLACTONE 25 MG/1
TABLET ORAL
Qty: 90 TAB | Refills: 3 | Status: SHIPPED | OUTPATIENT
Start: 2020-01-03 | End: 2021-01-04

## 2020-01-03 RX ORDER — SPIRONOLACTONE 25 MG/1
TABLET ORAL
Qty: 90 TAB | Refills: 2 | Status: SHIPPED | OUTPATIENT
Start: 2020-01-03 | End: 2020-01-03 | Stop reason: SDUPTHER

## 2020-01-10 NOTE — PROGRESS NOTES
I have personally seen and evaluated the device findings. Interrogation reviewed and I agree with assessment.     Josie Barriga

## 2020-02-25 RX ORDER — APIXABAN 5 MG/1
TABLET, FILM COATED ORAL
Qty: 60 TAB | Refills: 5 | Status: SHIPPED | OUTPATIENT
Start: 2020-02-25 | End: 2020-08-14

## 2020-04-16 ENCOUNTER — CLINICAL SUPPORT (OUTPATIENT)
Dept: CARDIOLOGY CLINIC | Age: 80
End: 2020-04-16

## 2020-04-16 DIAGNOSIS — Z95.0 PACEMAKER: ICD-10-CM

## 2020-04-16 DIAGNOSIS — I48.20 CHRONIC ATRIAL FIBRILLATION (HCC): Primary | ICD-10-CM

## 2020-04-16 NOTE — PROGRESS NOTES
I have personally seen and evaluated the device findings. Interrogation reviewed and I agree with assessment.     Ana Weathers

## 2020-07-30 ENCOUNTER — CLINICAL SUPPORT (OUTPATIENT)
Dept: CARDIOLOGY CLINIC | Age: 80
End: 2020-07-30

## 2020-07-30 ENCOUNTER — OFFICE VISIT (OUTPATIENT)
Dept: CARDIOLOGY CLINIC | Age: 80
End: 2020-07-30

## 2020-07-30 VITALS
SYSTOLIC BLOOD PRESSURE: 130 MMHG | BODY MASS INDEX: 26.13 KG/M2 | HEART RATE: 75 BPM | DIASTOLIC BLOOD PRESSURE: 70 MMHG | WEIGHT: 142 LBS | HEIGHT: 62 IN | OXYGEN SATURATION: 97 %

## 2020-07-30 DIAGNOSIS — Z95.0 PACEMAKER: ICD-10-CM

## 2020-07-30 DIAGNOSIS — I48.20 CHRONIC ATRIAL FIBRILLATION (HCC): Primary | ICD-10-CM

## 2020-07-30 DIAGNOSIS — I25.10 ATHEROSCLEROSIS OF NATIVE CORONARY ARTERY OF NATIVE HEART WITHOUT ANGINA PECTORIS: ICD-10-CM

## 2020-07-30 RX ORDER — PIOGLITAZONEHYDROCHLORIDE 30 MG/1
30 TABLET ORAL DAILY
COMMUNITY

## 2020-07-30 RX ORDER — LEVOTHYROXINE SODIUM 75 UG/1
75 TABLET ORAL
COMMUNITY

## 2020-07-30 NOTE — PROGRESS NOTES
Re Mccartney presents today for   Chief Complaint   Patient presents with    Irregular Heart Beat     6 month follow up        Re Mccartney preferred language for health care discussion is english/other. Is someone accompanying this pt? no    Is the patient using any DME equipment during [address]? no    Depression Screening:  3 most recent PHQ Screens 7/30/2020   Little interest or pleasure in doing things Not at all   Feeling down, depressed, irritable, or hopeless Not at all   Total Score PHQ 2 0       Learning Assessment:  Learning Assessment 5/20/2014   PRIMARY LEARNER Patient   HIGHEST LEVEL OF EDUCATION - PRIMARY LEARNER  SOME COLLEGE   BARRIERS PRIMARY LEARNER NONE   CO-LEARNER CAREGIVER No   PRIMARY LANGUAGE ENGLISH    NEED No   LEARNER PREFERENCE PRIMARY DEMONSTRATION     VIDEOS   ANSWERED BY patient   RELATIONSHIP SELF       Abuse Screening:  Abuse Screening Questionnaire 7/30/2020   Do you ever feel afraid of your partner? N   Are you in a relationship with someone who physically or mentally threatens you? N   Is it safe for you to go home? Y       Fall Risk  Fall Risk Assessment, last 12 mths 7/30/2020   Able to walk? Yes   Fall in past 12 months? No       Pt currently taking Anticoagulant therapy? Eliquis     Coordination of Care:  1. Have you been to the ER, urgent care clinic since your last visit? Hospitalized since your last visit? no    2. Have you seen or consulted any other health care providers outside of the [address] [place] [address] since your last visit? Include any pap smears or colon screening.  no

## 2020-07-30 NOTE — PROGRESS NOTES
Vernon Boston    Chief Complaint   Patient presents with    Irregular Heart Beat     6 month follow up        HPI    Vernon Boston is a 79 y.o. with coronary disease, chronic A. fib, sick sinus syndrome status post PPM, CKD, mitral valve prolapse, polymyalgia rheumatica on steroids, here for routine follow up. No major complaints, has some chronic SOB. She has a history of palpitations and mild coronary artery disease. She had a cardiac catheterization on September 20, 1995, which demonstrated:    1. Patent left main trunk. 2. Patent LAD. 3. Minor plaquing of the distal segment of the circumflex artery on a kink with less than 30% stenosis. 4. Patent nondominant right coronary artery with very mild diffuse disease.    5. Normal left ventricle. She has had a mid to late systolic murmur at the apex with no click, and the diagnosis of mitral valve prolapse and mild mitral regurgitation was established clinically as well as by echocardiographic findings. She had a positive treadmill stress test but negative thallium findings.    She has some spotting in her vision in the right eye, for which she has had multiple diagnostic procedures, including an MRI scan, and was told that she might have had an embolic stroke or a mild hemorrhage with the hypertension. She has been on Plavix since then. She had an adenosine Cardiolite myocardial perfusion scan on 02/22/05, at which time she had 2 mm ST depression on electrocardiogram with adenosine only, but the perfusion study was normal. The gated SPECT imaging revealed EF in the 70% range. Because of the EKG abnormalities with the adenosine, the possibility of balanced ischemia with multi-vessel coronary disease was entertained and subsequently, she had a stress echocardiogram on 03/16/05, at which time she exercised 8 minutes and 41 seconds with no chest pain, but developed 2 mm of ST depression.  There was no evidence of wall motion abnormalities in the presence of the ST depression and LV function remained normal. It was felt that the nuclear imaging and echocardiographic findings were normal with the EKG abnormalities. Therefore, there was no clear-cut clinical evidence of ischemia. She has been continued on medical treatment.    She had an echocardiogram in January 2008 which demonstrated normal LV function with EF in the 70-75% range. There was mild to moderate mitral regurgitation. She also had an Adenosine Cardiolite myocardial perfusion scan which demonstrated normal perfusion imaging and normal LV function. She had repeat stress nuclear cardiac imaging on September 20, 2010 which demonstrated normal perfusion and normal LV function.    She underwent stress nuclear cardiac imaging on 11/6/13 which demonstrated normal perfusion with no scaring or ischemia. She did have positive EKG changes without chest pain with exercise. The ejection fraction was estimated in the 80% range. She had followup stress nuclear cardiac imaging on 12/02/15, which demonstrated normal perfusion with no evidence of scarring or ischemia.  Ejection fraction was greater than 80% with a hyperdynamic ventricle.    Because of the frequent bradycardia with pauses on the Holter monitor on 05/19/16, we reduced the betablocker with no improvement.    She developed a racing heartbeat and was found to have atrial fibrillation when she was seen in the emergency room on 04/24/16. She was treated with atenolol 100 mg daily, which had to be reduced because of frequent pauses on the Holter monitor. But, a subsequent Holter monitor continued to reveal frequent long pauses with baseline atrial fibrillation. She subsequently underwent permanent pacemaker implantation on 05/31/16 with a dual chamber MRI compatible Medtronic pacemaker.    She underwent stress nuclear cardiac imaging on 4/22/19 which demonstrated normal perfusion with no evidence of scarring or ischemia.  EF was estimated at 71%.     Past Medical History:   Diagnosis Date    History of echocardiogram 01/02/2008    EF 70-75%. Mild-mod MR. Mod TR. PASP 40 mmHg.  History of myocardial perfusion scan 12/02/2015    Low risk. No ischemia or prior infarction. EF >80%. No chg from study of 11/7/13.  Hypercholesterolemia     Hypertension     Mild coronary artery disease 09/20/95    Cath: Minor plaquing of the distal segment of the circumflex artery on a kink with less than 30% stenosis    Mitral valve prolapse syndrome     with mid to late systolic murmur with no click    Palpitations     S/P cardiac cath 09/20/1995    dCx 30%. Otherwise patent coronaries.  Stroke [facility])     possible small embolic       Past Surgical History:   Procedure Laterality Date    HX ACL RECONSTRUCTION      left    HX CATARACT REMOVAL      bilateral    HX TOAN AND BSO      HX TONSILLECTOMY      JUNG BIOPSY BREAST STEREOTACTIC      left breast x 2 benign       Current Outpatient Medications   Medication Sig Dispense Refill    levothyroxine (SYNTHROID) 75 mcg tablet Take 75 mcg by mouth Daily (before breakfast).  pioglitazone (Actos) 30 mg tablet Take 30 mg by mouth daily.  ELIQUIS 5 mg tablet TAKE 1 TABLET BY MOUTH TWICE A DAY 60 Tab 5    spironolactone (ALDACTONE) 25 mg tablet TAKE 1 TABLET BY MOUTH EVERY DAY 90 Tab 3    magnesium chloride (SLOW-MAG) 71.5 mg tablet Take 143 mg by mouth daily.  cyanocobalamin (VITAMIN B12) 500 mcg tablet Take 1,000 mcg by mouth daily.  docusate sodium (COLACE) 100 mg capsule Take 100 mg by mouth two (2) times a day.  calcium citrate-vitamin D3 (CITRACAL WITH VITAMIN D MAXIMUM) tablet Take 2 Tabs by mouth daily.  brimonidine (MIRVASO) 0.33 % gel by Apply Externally route daily.  fish oil-omega-3 fatty acids 340-1,000 mg capsule Take 2 Caps by mouth daily.  vitamin e (E GEMS) 100 unit capsule Take  by mouth daily.       ascorbic acid, vitamin C, (VITAMIN C) 500 mg tablet Take 500 mg by mouth daily.      carvedilol (COREG) 25 mg tablet TAKE 1 TABLET BY MOUTH TWICE A DAY WITH MEALS 180 Tab 3    repaglinide (PRANDIN) 2 mg tablet Take 2 mg by mouth Before breakfast, lunch, and dinner.  cholecalciferol (VITAMIN D3) 1,000 unit cap Take  by mouth daily.  predniSONE (DELTASONE) 5 mg tablet Take  by mouth.  carboxymethyl/gly/poly80/PF (REFRESH OPTIVE BAILEE-3 OP) Apply  to eye as needed.  sodium chloride (SUNI-5) 5 % ophthalmic solution Administer 1 Drop to right eye daily.  ophthalmic irrigation solution (OCUSOFT IRRIGATING OPHTH SOLN) drop Apply 1 Drop to eye daily. Right eye      loteprednol etabonate (LOTEMAX) 0.5 % ophthalmic suspension Administer 1 Drop to both eyes daily.  cycloSPORINE (RESTASIS) 0.05 % ophthalmic emulsion Administer 1 Drop to both eyes two (2) times a day.  simvastatin (ZOCOR) 20 mg tablet Take 20 mg by mouth nightly.          Allergies   Allergen Reactions    Tagamet [Cimetidine] Unknown (comments)       Social History     Socioeconomic History    Marital status:      Spouse name: Not on file    Number of children: Not on file    Years of education: Not on file    Highest education level: Not on file   Occupational History    Not on file   Social Needs    Financial resource strain: Not on file    Food insecurity     Worry: Not on file     Inability: Not on file    Transportation needs     Medical: Not on file     Non-medical: Not on file   Tobacco Use    Smoking status: Former Smoker     Packs/day: 1.00     Years: 13.00     Pack years: 13.00     Last attempt to quit: 5/10/1969     Years since quittin.2    Smokeless tobacco: Never Used   Substance and Sexual Activity    Alcohol use: No    Drug use: No    Sexual activity: Not on file   Lifestyle    Physical activity     Days per week: Not on file     Minutes per session: Not on file    Stress: Not on file   Relationships    Social connections     Talks on phone: Not on file Gets together: Not on file     Attends [employer] service: Not on file     Active member of club or organization: Not on file     Attends meetings of clubs or organizations: Not on file     Relationship status: Not on file    Intimate partner violence     Fear of current or ex partner: Not on file     Emotionally abused: Not on file     Physically abused: Not on file     Forced sexual activity: Not on file   Other Topics Concern    Not on file   Social History Narrative    Not on file        The patient has a family history of    Review of Systems    14 pt Review of Systems is negative unless otherwise mentioned in the HPI. Wt Readings from Last 3 Encounters:   07/30/20 64.4 kg (142 lb)   12/31/19 64.9 kg (143 lb)   05/20/19 64.9 kg (143 lb)     Temp Readings from Last 3 Encounters:   06/01/16 97.8 °F (36.6 °C)   04/24/16 97.6 °F (36.4 °C)     BP Readings from Last 3 Encounters:   07/30/20 130/70   12/31/19 108/64   05/20/19 120/82     Pulse Readings from Last 3 Encounters:   07/30/20 75   12/31/19 70   05/20/19 80            Physical Exam:    Visit Vitals  /70 (BP 1 Location: Left arm, BP Patient Position: Sitting)   Pulse 75   Ht 5' 2\" (1.575 m)   Wt 64.4 kg (142 lb)   SpO2 97%   BMI 25.97 kg/m²      Physical Exam  HENT:      Head: Normocephalic and atraumatic. Eyes:      Pupils: Pupils are equal, round, and reactive to light. Cardiovascular:      Rate and Rhythm: Normal rate and regular rhythm. Heart sounds: Murmur present. No friction rub. No gallop. Pulmonary:      Effort: Pulmonary effort is normal. No respiratory distress. Breath sounds: Normal breath sounds. No wheezing or rales. Chest:      Chest wall: No tenderness. Abdominal:      General: Bowel sounds are normal.      Palpations: Abdomen is soft. Musculoskeletal:         General: No tenderness. Skin:     General: Skin is warm and dry.    Neurological:      Mental Status: She is alert and oriented to person, place, and time. Device: WNL, rates well controlled    Impression and Plan:  Elle Hwang is a 79 y.o. with:    1.) CAD  2.) pAF  3.) SSS s/p PPM  4.) CKD  5.) Mild MR  6.) PMR on steroids, known    1.) Doing well, device checked, no changes made, several Qs answered  2.) RTC 6 months with device check    Thank you for allowing me to participate in the care of your patient, please do not hesitate to call with questions or concerns.     [address],    Leonarda Fisher, DO

## 2020-07-31 NOTE — PROGRESS NOTES
I have personally seen and evaluated the device findings. Interrogation reviewed and I agree with assessment.     Makenzie Sutton

## 2020-08-14 RX ORDER — APIXABAN 5 MG/1
TABLET, FILM COATED ORAL
Qty: 60 TAB | Refills: 5 | Status: SHIPPED | OUTPATIENT
Start: 2020-08-14 | End: 2021-03-01

## 2020-08-15 DIAGNOSIS — I49.5 TACHYCARDIA-BRADYCARDIA SYNDROME (HCC): ICD-10-CM

## 2020-08-15 DIAGNOSIS — I48.19 PERSISTENT ATRIAL FIBRILLATION (HCC): ICD-10-CM

## 2020-08-17 RX ORDER — CARVEDILOL 25 MG/1
TABLET ORAL
Qty: 180 TAB | Refills: 3 | Status: SHIPPED | OUTPATIENT
Start: 2020-08-17 | End: 2021-08-17

## 2021-01-01 DIAGNOSIS — I11.9 BENIGN HYPERTENSIVE HEART DISEASE WITHOUT HEART FAILURE: ICD-10-CM

## 2021-01-04 RX ORDER — SPIRONOLACTONE 25 MG/1
TABLET ORAL
Qty: 90 TAB | Refills: 3 | Status: SHIPPED | OUTPATIENT
Start: 2021-01-04 | End: 2022-01-19

## 2021-03-01 RX ORDER — APIXABAN 5 MG/1
TABLET, FILM COATED ORAL
Qty: 60 TAB | Refills: 6 | Status: SHIPPED | OUTPATIENT
Start: 2021-03-01 | End: 2021-09-21

## 2021-03-10 ENCOUNTER — CLINICAL SUPPORT (OUTPATIENT)
Dept: CARDIOLOGY CLINIC | Age: 81
End: 2021-03-10
Payer: MEDICARE

## 2021-03-10 ENCOUNTER — OFFICE VISIT (OUTPATIENT)
Dept: CARDIOLOGY CLINIC | Age: 81
End: 2021-03-10
Payer: MEDICARE

## 2021-03-10 VITALS
HEART RATE: 79 BPM | HEIGHT: 60 IN | DIASTOLIC BLOOD PRESSURE: 70 MMHG | OXYGEN SATURATION: 98 % | WEIGHT: 141.2 LBS | BODY MASS INDEX: 27.72 KG/M2 | SYSTOLIC BLOOD PRESSURE: 135 MMHG

## 2021-03-10 DIAGNOSIS — Z95.0 PACEMAKER: ICD-10-CM

## 2021-03-10 DIAGNOSIS — I48.19 PERSISTENT ATRIAL FIBRILLATION (HCC): Primary | ICD-10-CM

## 2021-03-10 DIAGNOSIS — I25.10 ATHEROSCLEROSIS OF NATIVE CORONARY ARTERY OF NATIVE HEART WITHOUT ANGINA PECTORIS: ICD-10-CM

## 2021-03-10 PROCEDURE — G8510 SCR DEP NEG, NO PLAN REQD: HCPCS | Performed by: INTERNAL MEDICINE

## 2021-03-10 PROCEDURE — 1101F PT FALLS ASSESS-DOCD LE1/YR: CPT | Performed by: INTERNAL MEDICINE

## 2021-03-10 PROCEDURE — 1090F PRES/ABSN URINE INCON ASSESS: CPT | Performed by: INTERNAL MEDICINE

## 2021-03-10 PROCEDURE — 99214 OFFICE O/P EST MOD 30 MIN: CPT | Performed by: INTERNAL MEDICINE

## 2021-03-10 PROCEDURE — G8536 NO DOC ELDER MAL SCRN: HCPCS | Performed by: INTERNAL MEDICINE

## 2021-03-10 PROCEDURE — G8419 CALC BMI OUT NRM PARAM NOF/U: HCPCS | Performed by: INTERNAL MEDICINE

## 2021-03-10 PROCEDURE — G8427 DOCREV CUR MEDS BY ELIG CLIN: HCPCS | Performed by: INTERNAL MEDICINE

## 2021-03-10 PROCEDURE — G8400 PT W/DXA NO RESULTS DOC: HCPCS | Performed by: INTERNAL MEDICINE

## 2021-03-10 RX ORDER — GENTAMICIN SULFATE 3 MG/ML
1 SOLUTION/ DROPS OPHTHALMIC EVERY 4 HOURS
COMMUNITY
End: 2022-03-23

## 2021-03-10 RX ORDER — SULFAMETHOXAZOLE AND TRIMETHOPRIM 400; 80 MG/1; MG/1
1 TABLET ORAL 2 TIMES DAILY
COMMUNITY
End: 2022-03-23

## 2021-03-10 NOTE — PROGRESS NOTES
Cassidy Diez    F/u CAD, pAFib/ SSS    HPI    Cassidy Diez is a 79 y.o. with coronary disease, chronic A. fib, sick sinus syndrome status post PPM, CKD, mitral valve prolapse, polymyalgia rheumatica on steroids, here for routine follow up. No major complaints, has some chronic SOB. She has a history of palpitations and mild coronary artery disease. She had a cardiac catheterization on September 20, 1995, which demonstrated:    1. Patent left main trunk. 2. Patent LAD. 3. Minor plaquing of the distal segment of the circumflex artery on a kink with less than 30% stenosis. 4. Patent nondominant right coronary artery with very mild diffuse disease.    5. Normal left ventricle. She has had a mid to late systolic murmur at the apex with no click, and the diagnosis of mitral valve prolapse and mild mitral regurgitation was established clinically as well as by echocardiographic findings. She had a positive treadmill stress test but negative thallium findings.    She has some spotting in her vision in the right eye, for which she has had multiple diagnostic procedures, including an MRI scan, and was told that she might have had an embolic stroke or a mild hemorrhage with the hypertension. She has been on Plavix since then. She had an adenosine Cardiolite myocardial perfusion scan on 02/22/05, at which time she had 2 mm ST depression on electrocardiogram with adenosine only, but the perfusion study was normal. The gated SPECT imaging revealed EF in the 70% range. Because of the EKG abnormalities with the adenosine, the possibility of balanced ischemia with multi-vessel coronary disease was entertained and subsequently, she had a stress echocardiogram on 03/16/05, at which time she exercised 8 minutes and 41 seconds with no chest pain, but developed 2 mm of ST depression.  There was no evidence of wall motion abnormalities in the presence of the ST depression and LV function remained normal. It was felt that the nuclear imaging and echocardiographic findings were normal with the EKG abnormalities. Therefore, there was no clear-cut clinical evidence of ischemia. She has been continued on medical treatment.    She had an echocardiogram in January 2008 which demonstrated normal LV function with EF in the 70-75% range. There was mild to moderate mitral regurgitation. She also had an Adenosine Cardiolite myocardial perfusion scan which demonstrated normal perfusion imaging and normal LV function. She had repeat stress nuclear cardiac imaging on September 20, 2010 which demonstrated normal perfusion and normal LV function.    She underwent stress nuclear cardiac imaging on 11/6/13 which demonstrated normal perfusion with no scaring or ischemia. She did have positive EKG changes without chest pain with exercise. The ejection fraction was estimated in the 80% range. She had followup stress nuclear cardiac imaging on 12/02/15, which demonstrated normal perfusion with no evidence of scarring or ischemia.  Ejection fraction was greater than 80% with a hyperdynamic ventricle.    Because of the frequent bradycardia with pauses on the Holter monitor on 05/19/16, we reduced the betablocker with no improvement.    She developed a racing heartbeat and was found to have atrial fibrillation when she was seen in the emergency room on 04/24/16. She was treated with atenolol 100 mg daily, which had to be reduced because of frequent pauses on the Holter monitor. But, a subsequent Holter monitor continued to reveal frequent long pauses with baseline atrial fibrillation. She subsequently underwent permanent pacemaker implantation on 05/31/16 with a dual chamber MRI compatible Medtronic pacemaker.    She underwent stress nuclear cardiac imaging on 4/22/19 which demonstrated normal perfusion with no evidence of scarring or ischemia.  EF was estimated at 71%.     Past Medical History:   Diagnosis Date    History of echocardiogram 01/02/2008    EF 70-75%. Mild-mod MR. Mod TR. PASP 40 mmHg.  History of myocardial perfusion scan 12/02/2015    Low risk. No ischemia or prior infarction. EF >80%. No chg from study of 11/7/13.  Hypercholesterolemia     Hypertension     Mild coronary artery disease 09/20/95    Cath: Minor plaquing of the distal segment of the circumflex artery on a kink with less than 30% stenosis    Mitral valve prolapse syndrome     with mid to late systolic murmur with no click    Palpitations     S/P cardiac cath 09/20/1995    dCx 30%. Otherwise patent coronaries.  Stroke [facility])     possible small embolic       Past Surgical History:   Procedure Laterality Date    HX ACL RECONSTRUCTION      left    HX CATARACT REMOVAL      bilateral    HX TOAN AND BSO      HX TONSILLECTOMY      JUNG BIOPSY BREAST STEREOTACTIC      left breast x 2 benign       Current Outpatient Medications   Medication Sig Dispense Refill    Eliquis 5 mg tablet TAKE 1 TABLET BY MOUTH TWICE A DAY 60 Tab 6    spironolactone (ALDACTONE) 25 mg tablet TAKE 1 TABLET BY MOUTH EVERY DAY 90 Tab 3    carvediloL (COREG) 25 mg tablet TAKE 1 TABLET BY MOUTH TWICE A DAY WITH MEALS 180 Tab 3    levothyroxine (SYNTHROID) 75 mcg tablet Take 75 mcg by mouth Daily (before breakfast).  pioglitazone (Actos) 30 mg tablet Take 30 mg by mouth daily.  magnesium chloride (SLOW-MAG) 71.5 mg tablet Take 143 mg by mouth daily.  cyanocobalamin (VITAMIN B12) 500 mcg tablet Take 1,000 mcg by mouth daily.  docusate sodium (COLACE) 100 mg capsule Take 100 mg by mouth two (2) times a day.  calcium citrate-vitamin D3 (CITRACAL WITH VITAMIN D MAXIMUM) tablet Take 2 Tabs by mouth daily.  brimonidine (MIRVASO) 0.33 % gel by Apply Externally route daily.  fish oil-omega-3 fatty acids 340-1,000 mg capsule Take 2 Caps by mouth daily.  vitamin e (E GEMS) 100 unit capsule Take  by mouth daily.       ascorbic acid, vitamin C, (VITAMIN C) 500 mg tablet Take 500 mg by mouth daily.  repaglinide (PRANDIN) 2 mg tablet Take 2 mg by mouth Before breakfast, lunch, and dinner.  cholecalciferol (VITAMIN D3) 1,000 unit cap Take  by mouth daily.  predniSONE (DELTASONE) 5 mg tablet Take  by mouth.  carboxymethyl/gly/poly80/PF (REFRESH OPTIVE BAILEE-3 OP) Apply  to eye as needed.  sodium chloride (SUNI-5) 5 % ophthalmic solution Administer 1 Drop to right eye daily.  ophthalmic irrigation solution (OCUSOFT IRRIGATING OPHTH SOLN) drop Apply 1 Drop to eye daily. Right eye      loteprednol etabonate (LOTEMAX) 0.5 % ophthalmic suspension Administer 1 Drop to both eyes daily.  cycloSPORINE (RESTASIS) 0.05 % ophthalmic emulsion Administer 1 Drop to both eyes two (2) times a day.  simvastatin (ZOCOR) 20 mg tablet Take 20 mg by mouth nightly.          Allergies   Allergen Reactions    Tagamet [Cimetidine] Unknown (comments)       Social History     Socioeconomic History    Marital status:      Spouse name: Not on file    Number of children: Not on file    Years of education: Not on file    Highest education level: Not on file   Occupational History    Not on file   Social Needs    Financial resource strain: Not on file    Food insecurity     Worry: Not on file     Inability: Not on file    Transportation needs     Medical: Not on file     Non-medical: Not on file   Tobacco Use    Smoking status: Former Smoker     Packs/day: 1.00     Years: 13.00     Pack years: 13.00     Quit date: 5/10/1969     Years since quittin.8    Smokeless tobacco: Never Used   Substance and Sexual Activity    Alcohol use: No    Drug use: No    Sexual activity: Not on file   Lifestyle    Physical activity     Days per week: Not on file     Minutes per session: Not on file    Stress: Not on file   Relationships    Social connections     Talks on phone: Not on file     Gets together: Not on file     Attends Confucianist service: Not on file Active member of club or organization: Not on file     Attends meetings of clubs or organizations: Not on file     Relationship status: Not on file    Intimate partner violence     Fear of current or ex partner: Not on file     Emotionally abused: Not on file     Physically abused: Not on file     Forced sexual activity: Not on file   Other Topics Concern    Not on file   Social History Narrative    Not on file        The patient has a family history of    Review of Systems    14 pt Review of Systems is negative unless otherwise mentioned in the HPI. Wt Readings from Last 3 Encounters:   07/30/20 64.4 kg (142 lb)   12/31/19 64.9 kg (143 lb)   05/20/19 64.9 kg (143 lb)     Temp Readings from Last 3 Encounters:   06/01/16 97.8 °F (36.6 °C)   04/24/16 97.6 °F (36.4 °C)     BP Readings from Last 3 Encounters:   07/30/20 130/70   12/31/19 108/64   05/20/19 120/82     Pulse Readings from Last 3 Encounters:   07/30/20 75   12/31/19 70   05/20/19 80       Physical Exam:    There were no vitals taken for this visit. Physical Exam  HENT:      Head: Normocephalic and atraumatic. Eyes:      Pupils: Pupils are equal, round, and reactive to light. Cardiovascular:      Rate and Rhythm: Normal rate and regular rhythm. Heart sounds: Murmur present. No friction rub. No gallop. Pulmonary:      Effort: Pulmonary effort is normal. No respiratory distress. Breath sounds: Normal breath sounds. No wheezing or rales. Chest:      Chest wall: No tenderness. Abdominal:      General: Bowel sounds are normal.      Palpations: Abdomen is soft. Musculoskeletal:         General: No tenderness. Skin:     General: Skin is warm and dry. Neurological:      Mental Status: She is alert and oriented to person, place, and time.          Device: WNL, rates well controlled    Impression and Plan:  Jojo Blackmon is a 79 y.o. with:    1.) CAD  2.) permanent AFib  3.) SSS s/p PPM  4.) CKD  5.) Mild MR  6.) PMR on steroids, known    1.) Doing well, device checked, no changes made, several Qs answered  2.) RTC 6 months with device check    Aware on Abx due to tear duct infection  Has some mild GOMEZ, doubt significant CV etiology  Thank you for allowing me to participate in the care of your patient, please do not hesitate to call with questions or concerns.     [address],    Viral Quevedo, DO

## 2021-03-22 PROCEDURE — 93279 PRGRMG DEV EVAL PM/LDLS PM: CPT | Performed by: INTERNAL MEDICINE

## 2021-03-22 NOTE — PROGRESS NOTES
Dr Jevon Garay patient . Persistent afib, on eliquis , V paced 59%, Lead impedance and threshold WNL.

## 2021-03-22 NOTE — PROGRESS NOTES
Device check personally reviewed by me. Normal device function on interrogation. See scanned interrogation document for complete details.

## 2021-08-17 DIAGNOSIS — I48.19 PERSISTENT ATRIAL FIBRILLATION (HCC): ICD-10-CM

## 2021-08-17 DIAGNOSIS — I49.5 TACHYCARDIA-BRADYCARDIA SYNDROME (HCC): ICD-10-CM

## 2021-08-17 RX ORDER — CARVEDILOL 25 MG/1
TABLET ORAL
Qty: 180 TABLET | Refills: 3 | Status: SHIPPED | OUTPATIENT
Start: 2021-08-17 | End: 2022-10-10 | Stop reason: SDUPTHER

## 2021-09-21 RX ORDER — APIXABAN 5 MG/1
TABLET, FILM COATED ORAL
Qty: 60 TABLET | Refills: 6 | Status: SHIPPED | OUTPATIENT
Start: 2021-09-21 | End: 2022-03-31

## 2021-09-22 ENCOUNTER — CLINICAL SUPPORT (OUTPATIENT)
Dept: CARDIOLOGY CLINIC | Age: 81
End: 2021-09-22

## 2021-09-22 ENCOUNTER — OFFICE VISIT (OUTPATIENT)
Dept: CARDIOLOGY CLINIC | Age: 81
End: 2021-09-22
Payer: MEDICARE

## 2021-09-22 VITALS
HEART RATE: 66 BPM | WEIGHT: 149 LBS | SYSTOLIC BLOOD PRESSURE: 120 MMHG | BODY MASS INDEX: 29.25 KG/M2 | OXYGEN SATURATION: 98 % | HEIGHT: 60 IN | DIASTOLIC BLOOD PRESSURE: 66 MMHG

## 2021-09-22 DIAGNOSIS — I34.1 MITRAL VALVE PROLAPSE SYNDROME: ICD-10-CM

## 2021-09-22 DIAGNOSIS — Z95.0 PACEMAKER: Primary | ICD-10-CM

## 2021-09-22 DIAGNOSIS — I49.5 TACHYCARDIA-BRADYCARDIA SYNDROME (HCC): ICD-10-CM

## 2021-09-22 DIAGNOSIS — Z95.0 PACEMAKER: ICD-10-CM

## 2021-09-22 DIAGNOSIS — I48.19 PERSISTENT ATRIAL FIBRILLATION (HCC): Primary | ICD-10-CM

## 2021-09-22 PROCEDURE — G8427 DOCREV CUR MEDS BY ELIG CLIN: HCPCS | Performed by: INTERNAL MEDICINE

## 2021-09-22 PROCEDURE — 99214 OFFICE O/P EST MOD 30 MIN: CPT | Performed by: INTERNAL MEDICINE

## 2021-09-22 PROCEDURE — G8400 PT W/DXA NO RESULTS DOC: HCPCS | Performed by: INTERNAL MEDICINE

## 2021-09-22 PROCEDURE — G8419 CALC BMI OUT NRM PARAM NOF/U: HCPCS | Performed by: INTERNAL MEDICINE

## 2021-09-22 PROCEDURE — 1101F PT FALLS ASSESS-DOCD LE1/YR: CPT | Performed by: INTERNAL MEDICINE

## 2021-09-22 PROCEDURE — G8536 NO DOC ELDER MAL SCRN: HCPCS | Performed by: INTERNAL MEDICINE

## 2021-09-22 PROCEDURE — G8510 SCR DEP NEG, NO PLAN REQD: HCPCS | Performed by: INTERNAL MEDICINE

## 2021-09-22 PROCEDURE — 1090F PRES/ABSN URINE INCON ASSESS: CPT | Performed by: INTERNAL MEDICINE

## 2021-09-22 PROCEDURE — 93280 PM DEVICE PROGR EVAL DUAL: CPT | Performed by: INTERNAL MEDICINE

## 2021-09-22 NOTE — PROGRESS NOTES
Wing Will presents today for   Chief Complaint   Patient presents with    Follow-up     6 month follow up    Pacemaker Check     Medtronic       Wing Will preferred language for health care discussion is english/other. Is someone accompanying this pt? no    Is the patient using any DME equipment during [address]? no    Depression Screening:  3 most recent PHQ Screens 9/22/2021   Little interest or pleasure in doing things Not at all   Feeling down, depressed, irritable, or hopeless Not at all   Total Score PHQ 2 0       Learning Assessment:  Learning Assessment 9/22/2021   PRIMARY LEARNER Patient   HIGHEST LEVEL OF EDUCATION - PRIMARY LEARNER  -   BARRIERS PRIMARY LEARNER -   [facility]    NEED -   LEARNER PREFERENCE PRIMARY DEMONSTRATION     -   ANSWERED BY patient   RELATIONSHIP SELF       Abuse Screening:  Abuse Screening Questionnaire 9/22/2021   Do you ever feel afraid of your partner? N   Are you in a relationship with someone who physically or mentally threatens you? N   Is it safe for you to go home? Y       Fall Risk  Fall Risk Assessment, last 12 mths 9/22/2021   Able to walk? Yes   Fall in past 12 months? 0   Do you feel unsteady? 0   Are you worried about falling 0           Pt currently taking Anticoagulant therapy? Eliquis 5 mg twice a day    Pt currently taking Antiplatelet therapy ? no      Coordination of Care:  1. Have you been to the ER, urgent care clinic since your last visit? Hospitalized since your last visit? no    2. Have you seen or consulted any other health care providers outside of the [address] [place] [address] since your last visit? Include any pap smears or colon screening.  no

## 2021-09-22 NOTE — PROGRESS NOTES
Vale Pfeiffer    F/u CAD, pAFib/ SSS    HPI    Vale Pfeiffer is a 79 y.o. with coronary disease, chronic A. fib, sick sinus syndrome status post PPM, CKD, mitral valve prolapse, polymyalgia rheumatica on steroids, here for routine follow up. No major complaints, has some chronic SOB. She has a history of palpitations and mild coronary artery disease. She had a cardiac catheterization on September 20, 1995, which demonstrated:    1. Patent left main trunk. 2. Patent LAD. 3. Minor plaquing of the distal segment of the circumflex artery on a kink with less than 30% stenosis. 4. Patent nondominant right coronary artery with very mild diffuse disease.    5. Normal left ventricle. She has had a mid to late systolic murmur at the apex with no click, and the diagnosis of mitral valve prolapse and mild mitral regurgitation was established clinically as well as by echocardiographic findings. She had a positive treadmill stress test but negative thallium findings.    She has some spotting in her vision in the right eye, for which she has had multiple diagnostic procedures, including an MRI scan, and was told that she might have had an embolic stroke or a mild hemorrhage with the hypertension. She has been on Plavix since then. She had an adenosine Cardiolite myocardial perfusion scan on 02/22/05, at which time she had 2 mm ST depression on electrocardiogram with adenosine only, but the perfusion study was normal. The gated SPECT imaging revealed EF in the 70% range. Because of the EKG abnormalities with the adenosine, the possibility of balanced ischemia with multi-vessel coronary disease was entertained and subsequently, she had a stress echocardiogram on 03/16/05, at which time she exercised 8 minutes and 41 seconds with no chest pain, but developed 2 mm of ST depression.  There was no evidence of wall motion abnormalities in the presence of the ST depression and LV function remained normal. It was felt that the nuclear imaging and echocardiographic findings were normal with the EKG abnormalities. Therefore, there was no clear-cut clinical evidence of ischemia. She has been continued on medical treatment.    She had an echocardiogram in January 2008 which demonstrated normal LV function with EF in the 70-75% range. There was mild to moderate mitral regurgitation. She also had an Adenosine Cardiolite myocardial perfusion scan which demonstrated normal perfusion imaging and normal LV function. She had repeat stress nuclear cardiac imaging on September 20, 2010 which demonstrated normal perfusion and normal LV function.    She underwent stress nuclear cardiac imaging on 11/6/13 which demonstrated normal perfusion with no scaring or ischemia. She did have positive EKG changes without chest pain with exercise. The ejection fraction was estimated in the 80% range. She had followup stress nuclear cardiac imaging on 12/02/15, which demonstrated normal perfusion with no evidence of scarring or ischemia.  Ejection fraction was greater than 80% with a hyperdynamic ventricle.    Because of the frequent bradycardia with pauses on the Holter monitor on 05/19/16, we reduced the betablocker with no improvement.    She developed a racing heartbeat and was found to have atrial fibrillation when she was seen in the emergency room on 04/24/16. She was treated with atenolol 100 mg daily, which had to be reduced because of frequent pauses on the Holter monitor. But, a subsequent Holter monitor continued to reveal frequent long pauses with baseline atrial fibrillation. She subsequently underwent permanent pacemaker implantation on 05/31/16 with a dual chamber MRI compatible Medtronic pacemaker.    She underwent stress nuclear cardiac imaging on 4/22/19 which demonstrated normal perfusion with no evidence of scarring or ischemia.  EF was estimated at 71%.     Past Medical History:   Diagnosis Date    History of echocardiogram 01/02/2008    EF 70-75%. Mild-mod MR. Mod TR. PASP 40 mmHg.  History of myocardial perfusion scan 12/02/2015    Low risk. No ischemia or prior infarction. EF >80%. No chg from study of 11/7/13.  Hypercholesterolemia     Hypertension     Mild coronary artery disease 09/20/95    Cath: Minor plaquing of the distal segment of the circumflex artery on a kink with less than 30% stenosis    Mitral valve prolapse syndrome     with mid to late systolic murmur with no click    Palpitations     S/P cardiac cath 09/20/1995    dCx 30%. Otherwise patent coronaries.  Stroke [facility])     possible small embolic       Past Surgical History:   Procedure Laterality Date    HX ACL RECONSTRUCTION      left    HX CATARACT REMOVAL      bilateral    HX TOAN AND BSO      HX TONSILLECTOMY      JUNG BIOPSY BREAST STEREOTACTIC      left breast x 2 benign       Current Outpatient Medications   Medication Sig Dispense Refill    Eliquis 5 mg tablet TAKE 1 TABLET BY MOUTH TWICE A DAY 60 Tablet 6    carvediloL (COREG) 25 mg tablet TAKE 1 TABLET BY MOUTH TWICE A DAY WITH MEALS 180 Tablet 3    gentamicin (GARAMYCIN) 0.3 % ophthalmic solution 1 Drop every four (4) hours.  trimethoprim-sulfamethoxazole (Bactrim)  mg per tablet Take 1 Tab by mouth two (2) times a day.  spironolactone (ALDACTONE) 25 mg tablet TAKE 1 TABLET BY MOUTH EVERY DAY 90 Tab 3    levothyroxine (SYNTHROID) 75 mcg tablet Take 75 mcg by mouth Daily (before breakfast).  pioglitazone (Actos) 30 mg tablet Take 30 mg by mouth daily.  magnesium chloride (SLOW-MAG) 71.5 mg tablet Take 143 mg by mouth daily.  cyanocobalamin (VITAMIN B12) 500 mcg tablet Take 1,000 mcg by mouth daily.  docusate sodium (COLACE) 100 mg capsule Take 100 mg by mouth two (2) times a day.  calcium citrate-vitamin D3 (CITRACAL WITH VITAMIN D MAXIMUM) tablet Take 2 Tabs by mouth daily.  brimonidine (MIRVASO) 0.33 % gel by Apply Externally route daily.       fish oil-omega-3 fatty acids 340-1,000 mg capsule Take 2 Caps by mouth daily.  vitamin e (E GEMS) 100 unit capsule Take  by mouth daily.  ascorbic acid, vitamin C, (VITAMIN C) 500 mg tablet Take 500 mg by mouth daily.  repaglinide (PRANDIN) 2 mg tablet Take 2 mg by mouth Before breakfast, lunch, and dinner.  cholecalciferol (VITAMIN D3) 1,000 unit cap Take  by mouth daily.  predniSONE (DELTASONE) 5 mg tablet Take  by mouth.  carboxymethyl/gly/poly80/PF (REFRESH OPTIVE BAILEE-3 OP) Apply  to eye as needed.  sodium chloride (SUNI-5) 5 % ophthalmic solution Administer 1 Drop to right eye daily.  ophthalmic irrigation solution (OCUSOFT IRRIGATING OPHTH SOLN) drop Apply 1 Drop to eye daily. Right eye      loteprednol etabonate (LOTEMAX) 0.5 % ophthalmic suspension Administer 1 Drop to both eyes daily.  cycloSPORINE (RESTASIS) 0.05 % ophthalmic emulsion Administer 1 Drop to both eyes two (2) times a day.  simvastatin (ZOCOR) 20 mg tablet Take 20 mg by mouth nightly.          Allergies   Allergen Reactions    Tagamet [Cimetidine] Unknown (comments)       Social History     Socioeconomic History    Marital status:      Spouse name: Not on file    Number of children: Not on file    Years of education: Not on file    Highest education level: Not on file   Occupational History    Not on file   Tobacco Use    Smoking status: Former Smoker     Packs/day: 1.00     Years: 13.00     Pack years: 13.00     Quit date: 5/10/1969     Years since quittin.4    Smokeless tobacco: Never Used   Substance and Sexual Activity    Alcohol use: No    Drug use: No    Sexual activity: Not on file   Other Topics Concern    Not on file   Social History Narrative    Not on file     Social Determinants of Health     Financial Resource Strain:     Difficulty of Paying Living Expenses:    Food Insecurity:     Worried About Running Out of Food in the Last Year:     [address] in the Last Year:    Transportation Needs:     Lack of Transportation (Medical):  Lack of Transportation (Non-Medical):    Physical Activity:     Days of Exercise per Week:     Minutes of Exercise per Session:    Stress:     Feeling of Stress :    Social Connections:     Frequency of Communication with Friends and Family:     Frequency of Social Gatherings with Friends and Family:     Attends Mu-ism Services:     Active Member of Clubs or Organizations:     Attends Club or Organization Meetings:     Marital Status:    Intimate Partner Violence:     Fear of Current or Ex-Partner:     Emotionally Abused:     Physically Abused:     Sexually Abused:         FH: n/a    Review of Systems    14 pt Review of Systems is negative unless otherwise mentioned in the HPI. Wt Readings from Last 3 Encounters:   03/10/21 64 kg (141 lb 3.2 oz)   07/30/20 64.4 kg (142 lb)   12/31/19 64.9 kg (143 lb)     Temp Readings from Last 3 Encounters:   06/01/16 97.8 °F (36.6 °C)   04/24/16 97.6 °F (36.4 °C)     BP Readings from Last 3 Encounters:   03/10/21 135/70   07/30/20 130/70   12/31/19 108/64     Pulse Readings from Last 3 Encounters:   03/10/21 79   07/30/20 75   12/31/19 70       Physical Exam:    Visit Vitals  Ht 5' (1.524 m)   BMI 27.58 kg/m²      Physical Exam  HENT:      Head: Normocephalic and atraumatic. Eyes:      Pupils: Pupils are equal, round, and reactive to light. Cardiovascular:      Rate and Rhythm: Normal rate and regular rhythm. Heart sounds: Murmur heard. No friction rub. No gallop. Pulmonary:      Effort: Pulmonary effort is normal. No respiratory distress. Breath sounds: Normal breath sounds. No wheezing or rales. Chest:      Chest wall: No tenderness. Abdominal:      General: Bowel sounds are normal.      Palpations: Abdomen is soft. Musculoskeletal:         General: No tenderness. Skin:     General: Skin is warm and dry.    Neurological:      Mental Status: She is alert and oriented to person, place, and time. Device: WNL, rates well controlled    Lab Results   Component Value Date/Time    Sodium 140 04/23/2018 11:25 AM    Potassium 3.9 04/23/2018 11:25 AM    Chloride 106 04/23/2018 11:25 AM    CO2 26 04/23/2018 11:25 AM    Anion gap 8 04/23/2018 11:25 AM    Glucose 178 (H) 04/23/2018 11:25 AM    BUN 28 (H) 04/23/2018 11:25 AM    Creatinine 1.26 04/23/2018 11:25 AM    BUN/Creatinine ratio 22 (H) 04/23/2018 11:25 AM    GFR est AA 50 (L) 04/23/2018 11:25 AM    GFR est non-AA 41 (L) 04/23/2018 11:25 AM    Calcium 10.0 04/23/2018 11:25 AM    Bilirubin, total 0.4 05/27/2016 09:35 AM    Alk. phosphatase 81 05/27/2016 09:35 AM    Protein, total 6.9 05/27/2016 09:35 AM    Albumin 3.6 05/27/2016 09:35 AM    Globulin 3.3 05/27/2016 09:35 AM    A-G Ratio 1.1 05/27/2016 09:35 AM    ALT (SGPT) 29 05/27/2016 09:35 AM    AST (SGOT) 13 (L) 05/27/2016 09:35 AM         Impression and Plan:  Vale Pfeiffer is a 79 y.o. with:    1.) CAD  2.) permanent AFib  3.) SSS s/p PPM  4.) CKD  5.) Mild MR  6.) PMR on steroids, known  7.) H/o CVA    1.) Doing well, device checked, no changes made, several Qs answered  2.) RTC 6 months with device check    Mentions concern about blood vessels breaking in her eyes  Based on her wt and age, it is appropriate to reduce her Eliquis to 2.5 mg BID anyway but she was very apprehensive and didn't want to do it yet. Preferred to dw her eye dr at her followup and also her PCP first  Call me sooner if needed    Thank you for allowing me to participate in the care of your patient, please do not hesitate to call with questions or concerns.     [address] [facility],    [facility], DO

## 2021-09-30 NOTE — PROGRESS NOTES
I have personally seen and evaluated the device findings. Interrogation reviewed and I agree with assessment.     Jame Sparks

## 2022-01-18 DIAGNOSIS — I11.9 BENIGN HYPERTENSIVE HEART DISEASE WITHOUT HEART FAILURE: ICD-10-CM

## 2022-01-19 RX ORDER — SPIRONOLACTONE 25 MG/1
TABLET ORAL
Qty: 90 TABLET | Refills: 3 | Status: SHIPPED | OUTPATIENT
Start: 2022-01-19

## 2022-03-19 PROBLEM — H81.90 VESTIBULAR DYSFUNCTION: Status: ACTIVE | Noted: 2017-03-20

## 2022-03-19 PROBLEM — H83.2X9 VESTIBULAR DYSFUNCTION: Status: ACTIVE | Noted: 2017-03-20

## 2022-03-23 ENCOUNTER — CLINICAL SUPPORT (OUTPATIENT)
Dept: CARDIOLOGY CLINIC | Age: 82
End: 2022-03-23

## 2022-03-23 ENCOUNTER — OFFICE VISIT (OUTPATIENT)
Dept: CARDIOLOGY CLINIC | Age: 82
End: 2022-03-23
Payer: MEDICARE

## 2022-03-23 VITALS
BODY MASS INDEX: 27.09 KG/M2 | SYSTOLIC BLOOD PRESSURE: 132 MMHG | DIASTOLIC BLOOD PRESSURE: 80 MMHG | HEIGHT: 60 IN | OXYGEN SATURATION: 98 % | WEIGHT: 138 LBS | HEART RATE: 77 BPM

## 2022-03-23 DIAGNOSIS — R06.02 SOB (SHORTNESS OF BREATH): Primary | ICD-10-CM

## 2022-03-23 PROCEDURE — 99214 OFFICE O/P EST MOD 30 MIN: CPT | Performed by: INTERNAL MEDICINE

## 2022-03-23 PROCEDURE — G8400 PT W/DXA NO RESULTS DOC: HCPCS | Performed by: INTERNAL MEDICINE

## 2022-03-23 PROCEDURE — 1090F PRES/ABSN URINE INCON ASSESS: CPT | Performed by: INTERNAL MEDICINE

## 2022-03-23 PROCEDURE — G8510 SCR DEP NEG, NO PLAN REQD: HCPCS | Performed by: INTERNAL MEDICINE

## 2022-03-23 PROCEDURE — 1101F PT FALLS ASSESS-DOCD LE1/YR: CPT | Performed by: INTERNAL MEDICINE

## 2022-03-23 PROCEDURE — G8427 DOCREV CUR MEDS BY ELIG CLIN: HCPCS | Performed by: INTERNAL MEDICINE

## 2022-03-23 PROCEDURE — G8536 NO DOC ELDER MAL SCRN: HCPCS | Performed by: INTERNAL MEDICINE

## 2022-03-23 PROCEDURE — G8419 CALC BMI OUT NRM PARAM NOF/U: HCPCS | Performed by: INTERNAL MEDICINE

## 2022-03-23 NOTE — PROGRESS NOTES
Patricia Denson presents today for   Chief Complaint   Patient presents with    Follow-up     6 month follow up    Pacemaker Check     Medtronic       Patricia Denson preferred language for health care discussion is english/other. Is someone accompanying this pt? no    Is the patient using any DME equipment during [address]? no    Depression Screening:  3 most recent PHQ Screens 3/23/2022   Little interest or pleasure in doing things Not at all   Feeling down, depressed, irritable, or hopeless Not at all   Total Score PHQ 2 0       Learning Assessment:  Learning Assessment 3/23/2022   PRIMARY LEARNER Patient   HIGHEST LEVEL OF EDUCATION - PRIMARY LEARNER  -   BARRIERS PRIMARY LEARNER -   [facility]    NEED -   LEARNER PREFERENCE PRIMARY DEMONSTRATION     -   ANSWERED BY patient   RELATIONSHIP SELF       Abuse Screening:  Abuse Screening Questionnaire 3/23/2022   Do you ever feel afraid of your partner? N   Are you in a relationship with someone who physically or mentally threatens you? N   Is it safe for you to go home? Y       Fall Risk  Fall Risk Assessment, last 12 mths 3/23/2022   Able to walk? Yes   Fall in past 12 months? 0   Do you feel unsteady? 0   Are you worried about falling 0           Pt currently taking Anticoagulant therapy? Eliquis 5 mg twice a day    Pt currently taking Antiplatelet therapy ? no      Coordination of Care:  1. Have you been to the ER, urgent care clinic since your last visit? Hospitalized since your last visit? no    2. Have you seen or consulted any other health care providers outside of the [address] [place] [address] since your last visit? Include any pap smears or colon screening.  no

## 2022-03-23 NOTE — PROGRESS NOTES
Casimiro Ellington    F/u CAD, pAFib/ SSS    HPI    Casimiro Ellington is a 79 y.o. with coronary disease, chronic A. fib, sick sinus syndrome status post PPM, CKD, mitral valve prolapse, polymyalgia rheumatica on steroids, here for routine follow up. No major complaints, has some chronic SOB. She has a history of palpitations and mild coronary artery disease. She had a cardiac catheterization on September 20, 1995, which demonstrated:    1. Patent left main trunk. 2. Patent LAD. 3. Minor plaquing of the distal segment of the circumflex artery on a kink with less than 30% stenosis. 4. Patent nondominant right coronary artery with very mild diffuse disease.    5. Normal left ventricle. She has had a mid to late systolic murmur at the apex with no click, and the diagnosis of mitral valve prolapse and mild mitral regurgitation was established clinically as well as by echocardiographic findings. She had a positive treadmill stress test but negative thallium findings.    She has some spotting in her vision in the right eye, for which she has had multiple diagnostic procedures, including an MRI scan, and was told that she might have had an embolic stroke or a mild hemorrhage with the hypertension. She has been on Plavix since then. She had an adenosine Cardiolite myocardial perfusion scan on 02/22/05, at which time she had 2 mm ST depression on electrocardiogram with adenosine only, but the perfusion study was normal. The gated SPECT imaging revealed EF in the 70% range. Because of the EKG abnormalities with the adenosine, the possibility of balanced ischemia with multi-vessel coronary disease was entertained and subsequently, she had a stress echocardiogram on 03/16/05, at which time she exercised 8 minutes and 41 seconds with no chest pain, but developed 2 mm of ST depression.  There was no evidence of wall motion abnormalities in the presence of the ST depression and LV function remained normal. It was felt that the nuclear imaging and echocardiographic findings were normal with the EKG abnormalities. Therefore, there was no clear-cut clinical evidence of ischemia. She has been continued on medical treatment.    She had an echocardiogram in January 2008 which demonstrated normal LV function with EF in the 70-75% range. There was mild to moderate mitral regurgitation. She also had an Adenosine Cardiolite myocardial perfusion scan which demonstrated normal perfusion imaging and normal LV function. She had repeat stress nuclear cardiac imaging on September 20, 2010 which demonstrated normal perfusion and normal LV function.    She underwent stress nuclear cardiac imaging on 11/6/13 which demonstrated normal perfusion with no scaring or ischemia. She did have positive EKG changes without chest pain with exercise. The ejection fraction was estimated in the 80% range. She had followup stress nuclear cardiac imaging on 12/02/15, which demonstrated normal perfusion with no evidence of scarring or ischemia.  Ejection fraction was greater than 80% with a hyperdynamic ventricle.    Because of the frequent bradycardia with pauses on the Holter monitor on 05/19/16, we reduced the betablocker with no improvement.    She developed a racing heartbeat and was found to have atrial fibrillation when she was seen in the emergency room on 04/24/16. She was treated with atenolol 100 mg daily, which had to be reduced because of frequent pauses on the Holter monitor. But, a subsequent Holter monitor continued to reveal frequent long pauses with baseline atrial fibrillation. She subsequently underwent permanent pacemaker implantation on 05/31/16 with a dual chamber MRI compatible Medtronic pacemaker.    She underwent stress nuclear cardiac imaging on 4/22/19 which demonstrated normal perfusion with no evidence of scarring or ischemia.  EF was estimated at 71%.     Past Medical History:   Diagnosis Date    History of echocardiogram 01/02/2008    EF 70-75%. Mild-mod MR. Mod TR. PASP 40 mmHg.  History of myocardial perfusion scan 12/02/2015    Low risk. No ischemia or prior infarction. EF >80%. No chg from study of 11/7/13.  Hypercholesterolemia     Hypertension     Mild coronary artery disease 09/20/95    Cath: Minor plaquing of the distal segment of the circumflex artery on a kink with less than 30% stenosis    Mitral valve prolapse syndrome     with mid to late systolic murmur with no click    Palpitations     S/P cardiac cath 09/20/1995    dCx 30%. Otherwise patent coronaries.  Stroke [facility])     possible small embolic       Past Surgical History:   Procedure Laterality Date    HX ACL RECONSTRUCTION      left    HX CATARACT REMOVAL      bilateral    HX TOAN AND BSO      HX TONSILLECTOMY      JUNG BIOPSY BREAST STEREOTACTIC      left breast x 2 benign       Current Outpatient Medications   Medication Sig Dispense Refill    spironolactone (ALDACTONE) 25 mg tablet TAKE 1 TABLET BY MOUTH EVERY DAY 90 Tablet 3    Eliquis 5 mg tablet TAKE 1 TABLET BY MOUTH TWICE A DAY 60 Tablet 6    carvediloL (COREG) 25 mg tablet TAKE 1 TABLET BY MOUTH TWICE A DAY WITH MEALS 180 Tablet 3    levothyroxine (SYNTHROID) 75 mcg tablet Take 75 mcg by mouth Daily (before breakfast).  pioglitazone (Actos) 30 mg tablet Take 30 mg by mouth daily.  calcium citrate-vitamin D3 (CITRACAL WITH VITAMIN D MAXIMUM) tablet Take 2 Tabs by mouth daily.  brimonidine (MIRVASO) 0.33 % gel by Apply Externally route daily.  ascorbic acid, vitamin C, (VITAMIN C) 500 mg tablet Take 500 mg by mouth daily.  repaglinide (PRANDIN) 2 mg tablet Take 2 mg by mouth Before breakfast, lunch, and dinner.  cholecalciferol (VITAMIN D3) 1,000 unit cap Take  by mouth daily.  predniSONE (DELTASONE) 5 mg tablet Take  by mouth.  carboxymethyl/gly/poly80/PF (REFRESH OPTIVE BAILEE-3 OP) Apply  to eye as needed.       sodium chloride (SUNI-5) 5 % ophthalmic solution Administer 1 Drop to right eye daily.  ophthalmic irrigation solution (OCUSOFT IRRIGATING OPHTH SOLN) drop Apply 1 Drop to eye daily. Right eye      loteprednol etabonate (LOTEMAX) 0.5 % ophthalmic suspension Administer 1 Drop to both eyes daily.  cycloSPORINE (RESTASIS) 0.05 % ophthalmic emulsion Administer 1 Drop to both eyes two (2) times a day.  simvastatin (ZOCOR) 20 mg tablet Take 20 mg by mouth nightly. Allergies   Allergen Reactions    Tagamet [Cimetidine] Unknown (comments)       Social History     Socioeconomic History    Marital status:      Spouse name: Not on file    Number of children: Not on file    Years of education: Not on file    Highest education level: Not on file   Occupational History    Not on file   Tobacco Use    Smoking status: Former Smoker     Packs/day: 1.00     Years: 13.00     Pack years: 13.00     Quit date: 5/10/1969     Years since quittin.9    Smokeless tobacco: Never Used   Substance and Sexual Activity    Alcohol use: No    Drug use: No    Sexual activity: Not on file   Other Topics Concern    Not on file   Social History Narrative    Not on file     Social Determinants of Health     Financial Resource Strain:     Difficulty of Paying Living Expenses: Not on file   Food Insecurity:     Worried About [address] in the Last Year: Not on file    [address] in the Last Year: Not on file   Transportation Needs:     Lack of Transportation (Medical): Not on file    Lack of Transportation (Non-Medical):  Not on file   Physical Activity:     Days of Exercise per Week: Not on file    Minutes of Exercise per Session: Not on file   Stress:     Feeling of Stress : Not on file   Social Connections:     Frequency of Communication with Friends and Family: Not on file    Frequency of Social Gatherings with Friends and Family: Not on file    Attends [employer] Services: Not on file   [employer] or [employer]: Not on file    Attends Club or Organization Meetings: Not on file    Marital Status: Not on file   Intimate Partner Violence:     Fear of Current or Ex-Partner: Not on file    Emotionally Abused: Not on file    Physically Abused: Not on file    Sexually Abused: Not on file   Housing Stability:     Unable to Pay for Housing in the Last Year: Not on file    Number of [place] in the Last Year: Not on file    Unstable Housing in the Last Year: Not on file        FH: n/a    Review of Systems    14 pt Review of Systems is negative unless otherwise mentioned in the HPI. Wt Readings from Last 3 Encounters:   03/23/22 62.6 kg (138 lb)   09/22/21 67.6 kg (149 lb)   03/10/21 64 kg (141 lb 3.2 oz)     Temp Readings from Last 3 Encounters:   06/01/16 97.8 °F (36.6 °C)   04/24/16 97.6 °F (36.4 °C)     BP Readings from Last 3 Encounters:   03/23/22 132/80   09/22/21 120/66   03/10/21 135/70     Pulse Readings from Last 3 Encounters:   03/23/22 77   09/22/21 66   03/10/21 79       Physical Exam:    Visit Vitals  /80 (BP 1 Location: Left upper arm, BP Patient Position: Sitting, BP Cuff Size: Small adult)   Pulse 77   Ht 5' (1.524 m)   Wt 62.6 kg (138 lb)   SpO2 98%   BMI 26.95 kg/m²      Physical Exam  HENT:      Head: Normocephalic and atraumatic. Eyes:      Pupils: Pupils are equal, round, and reactive to light. Cardiovascular:      Rate and Rhythm: Normal rate and regular rhythm. Heart sounds: Murmur heard. No friction rub. No gallop. Pulmonary:      Effort: Pulmonary effort is normal. No respiratory distress. Breath sounds: Normal breath sounds. No wheezing or rales. Chest:      Chest wall: No tenderness. Abdominal:      General: Bowel sounds are normal.      Palpations: Abdomen is soft. Musculoskeletal:         General: No tenderness. Skin:     General: Skin is warm and dry.    Neurological:      Mental Status: She is alert and oriented to person, place, and time.         Device: WNL, rates well controlled    Lab Results   Component Value Date/Time    Sodium 140 04/23/2018 11:25 AM    Potassium 3.9 04/23/2018 11:25 AM    Chloride 106 04/23/2018 11:25 AM    CO2 26 04/23/2018 11:25 AM    Anion gap 8 04/23/2018 11:25 AM    Glucose 178 (H) 04/23/2018 11:25 AM    BUN 28 (H) 04/23/2018 11:25 AM    Creatinine 1.26 04/23/2018 11:25 AM    BUN/Creatinine ratio 22 (H) 04/23/2018 11:25 AM    GFR est AA 50 (L) 04/23/2018 11:25 AM    GFR est non-AA 41 (L) 04/23/2018 11:25 AM    Calcium 10.0 04/23/2018 11:25 AM    Bilirubin, total 0.4 05/27/2016 09:35 AM    Alk. phosphatase 81 05/27/2016 09:35 AM    Protein, total 6.9 05/27/2016 09:35 AM    Albumin 3.6 05/27/2016 09:35 AM    Globulin 3.3 05/27/2016 09:35 AM    A-G Ratio 1.1 05/27/2016 09:35 AM    ALT (SGPT) 29 05/27/2016 09:35 AM    AST (SGOT) 13 (L) 05/27/2016 09:35 AM         Impression and Plan:  Cisco Pacheco is a 79 y.o. with:    1.) CAD  2.) permanent AFib  3.) SSS s/p PPM  4.) CKD  5.) Mild MR  6.) PMR on steroids, known  7.) H/o CVA    1.) Doing well, device checked, no changes made, several Qs answered  2.) RTC 6 months with device check    35 mins,    Thank you for allowing me to participate in the care of your patient, please do not hesitate to call with questions or concerns.     [address],    Joseph Marshall, DO

## 2022-03-31 RX ORDER — APIXABAN 5 MG/1
TABLET, FILM COATED ORAL
Qty: 60 TABLET | Refills: 6 | Status: SHIPPED | OUTPATIENT
Start: 2022-03-31 | End: 2022-06-28 | Stop reason: SDUPTHER

## 2022-05-06 ENCOUNTER — TELEPHONE (OUTPATIENT)
Dept: CARDIOLOGY CLINIC | Age: 82
End: 2022-05-06

## 2022-05-06 NOTE — TELEPHONE ENCOUNTER
----- Message from Sandre Cheadle, DO sent at 5/6/2022  9:51 AM EDT -----  While TR and MR have increased, she was asymptomatic last time we talked  We can discuss further at her next visit if she'd even entertain idea of further workup    Thanks  ----- Message -----  From: Chantelle Núñez RN  Sent: 5/6/2022   6:31 AM EDT  To: Sandre Cheadle, DO    Per your last note \"    1.) CAD  2.) permanent AFib  3.) SSS s/p PPM  4.) CKD  5.) Mild MR  6.) PMR on steroids, known  7.) H/o CVA     1.) Doing well, device checked, no changes made, several Qs answered  2.) RTC 6 months with device check

## 2022-06-23 DIAGNOSIS — I49.5 TACHYCARDIA-BRADYCARDIA SYNDROME (HCC): ICD-10-CM

## 2022-06-23 DIAGNOSIS — I48.19 PERSISTENT ATRIAL FIBRILLATION (HCC): ICD-10-CM

## 2022-06-27 RX ORDER — CARVEDILOL 25 MG/1
TABLET ORAL
Qty: 180 TABLET | Refills: 3 | OUTPATIENT
Start: 2022-06-27

## 2022-09-15 ENCOUNTER — OFFICE VISIT (OUTPATIENT)
Dept: CARDIOLOGY CLINIC | Age: 82
End: 2022-09-15

## 2022-09-15 VITALS
HEART RATE: 72 BPM | DIASTOLIC BLOOD PRESSURE: 78 MMHG | WEIGHT: 136 LBS | SYSTOLIC BLOOD PRESSURE: 136 MMHG | BODY MASS INDEX: 26.7 KG/M2 | HEIGHT: 60 IN | OXYGEN SATURATION: 97 %

## 2022-09-15 DIAGNOSIS — R06.02 SOB (SHORTNESS OF BREATH): Primary | ICD-10-CM

## 2022-09-15 PROCEDURE — G8417 CALC BMI ABV UP PARAM F/U: HCPCS | Performed by: INTERNAL MEDICINE

## 2022-09-15 PROCEDURE — 99215 OFFICE O/P EST HI 40 MIN: CPT | Performed by: INTERNAL MEDICINE

## 2022-09-15 PROCEDURE — G8510 SCR DEP NEG, NO PLAN REQD: HCPCS | Performed by: INTERNAL MEDICINE

## 2022-09-15 PROCEDURE — G8536 NO DOC ELDER MAL SCRN: HCPCS | Performed by: INTERNAL MEDICINE

## 2022-09-15 PROCEDURE — 1123F ACP DISCUSS/DSCN MKR DOCD: CPT | Performed by: INTERNAL MEDICINE

## 2022-09-15 PROCEDURE — G8400 PT W/DXA NO RESULTS DOC: HCPCS | Performed by: INTERNAL MEDICINE

## 2022-09-15 PROCEDURE — G8427 DOCREV CUR MEDS BY ELIG CLIN: HCPCS | Performed by: INTERNAL MEDICINE

## 2022-09-15 PROCEDURE — 1101F PT FALLS ASSESS-DOCD LE1/YR: CPT | Performed by: INTERNAL MEDICINE

## 2022-09-15 PROCEDURE — 1090F PRES/ABSN URINE INCON ASSESS: CPT | Performed by: INTERNAL MEDICINE

## 2022-09-15 RX ORDER — MONTELUKAST SODIUM 10 MG/1
10 TABLET ORAL
COMMUNITY
Start: 2022-09-03

## 2022-09-15 NOTE — PROGRESS NOTES
Eliazar Noriega presents today for No chief complaint on file. Eliazar Noriega preferred language for health care discussion is english/other. Is someone accompanying this pt? yes    Is the patient using any DME equipment during [address]? no    Depression Screening:  3 most recent PHQ Screens 9/15/2022   Little interest or pleasure in doing things Not at all   Feeling down, depressed, irritable, or hopeless Not at all   Total Score PHQ 2 0       Learning Assessment:  Learning Assessment 9/15/2022   PRIMARY LEARNER Patient   HIGHEST LEVEL OF EDUCATION - PRIMARY LEARNER  -   BARRIERS PRIMARY LEARNER -   [facility]    NEED -   LEARNER PREFERENCE PRIMARY DEMONSTRATION     -   ANSWERED BY patient   RELATIONSHIP SELF       Abuse Screening:  Abuse Screening Questionnaire 9/15/2022   Do you ever feel afraid of your partner? N   Are you in a relationship with someone who physically or mentally threatens you? N   Is it safe for you to go home? Y       Fall Risk  Fall Risk Assessment, last 12 mths 9/15/2022   Able to walk? Yes   Fall in past 12 months? 0   Do you feel unsteady? 0   Are you worried about falling 0           Pt currently taking Anticoagulant therapy? Eliquis 5 mg twice a day    Pt currently taking Antiplatelet therapy ? no      Coordination of Care:  1. Have you been to the ER, urgent care clinic since your last visit? Hospitalized since your last visit? no    2. Have you seen or consulted any other health care providers outside of the [address] [place] [address] since your last visit? Include any pap smears or colon screening.  no

## 2022-09-15 NOTE — PROGRESS NOTES
Little Lopez    F/u CAD, pAFib/ SSS    HPI    Little Lopez is a 79 y.o. with coronary disease, chronic A. fib, sick sinus syndrome status post PPM, CKD, mitral valve prolapse, polymyalgia rheumatica on steroids, here for routine follow up. No major complaints, has some chronic SOB. She has a history of palpitations and mild coronary artery disease. She had a cardiac catheterization on September 20, 1995, which demonstrated:    Patent left main trunk. Patent LAD. Minor plaquing of the distal segment of the circumflex artery on a kink with less than 30% stenosis. Patent nondominant right coronary artery with very mild diffuse disease. Normal left ventricle. She has had a mid to late systolic murmur at the apex with no click, and the diagnosis of mitral valve prolapse and mild mitral regurgitation was established clinically as well as by echocardiographic findings. She had a positive treadmill stress test but negative thallium findings. She has some spotting in her vision in the right eye, for which she has had multiple diagnostic procedures, including an MRI scan, and was told that she might have had an embolic stroke or a mild hemorrhage with the hypertension. She has been on Plavix since then. She had an adenosine Cardiolite myocardial perfusion scan on 02/22/05, at which time she had 2 mm ST depression on electrocardiogram with adenosine only, but the perfusion study was normal. The gated SPECT imaging revealed EF in the 70% range. Because of the EKG abnormalities with the adenosine, the possibility of balanced ischemia with multi-vessel coronary disease was entertained and subsequently, she had a stress echocardiogram on 03/16/05, at which time she exercised 8 minutes and 41 seconds with no chest pain, but developed 2 mm of ST depression.  There was no evidence of wall motion abnormalities in the presence of the ST depression and LV function remained normal. It was felt that the nuclear imaging and echocardiographic findings were normal with the EKG abnormalities. Therefore, there was no clear-cut clinical evidence of ischemia. She has been continued on medical treatment. She had an echocardiogram in January 2008 which demonstrated normal LV function with EF in the 70-75% range. There was mild to moderate mitral regurgitation. She also had an Adenosine Cardiolite myocardial perfusion scan which demonstrated normal perfusion imaging and normal LV function. She had repeat stress nuclear cardiac imaging on September 20, 2010 which demonstrated normal perfusion and normal LV function. She underwent stress nuclear cardiac imaging on 11/6/13 which demonstrated normal perfusion with no scaring or ischemia. She did have positive EKG changes without chest pain with exercise. The ejection fraction was estimated in the 80% range. She had followup stress nuclear cardiac imaging on 12/02/15, which demonstrated normal perfusion with no evidence of scarring or ischemia. Ejection fraction was greater than 80% with a hyperdynamic ventricle. Because of the frequent bradycardia with pauses on the Holter monitor on 05/19/16, we reduced the betablocker with no improvement. She developed a racing heartbeat and was found to have atrial fibrillation when she was seen in the emergency room on 04/24/16. She was treated with atenolol 100 mg daily, which had to be reduced because of frequent pauses on the Holter monitor. But, a subsequent Holter monitor continued to reveal frequent long pauses with baseline atrial fibrillation. She subsequently underwent permanent pacemaker implantation on 05/31/16 with a dual chamber MRI compatible Medtronic pacemaker. She underwent stress nuclear cardiac imaging on 4/22/19 which demonstrated normal perfusion with no evidence of scarring or ischemia. EF was estimated at 71%. Past Medical History:   Diagnosis Date    History of echocardiogram 01/02/2008    EF 70-75%.   Mild-mod MR.  Mod TR. PASP 40 mmHg. History of myocardial perfusion scan 12/02/2015    Low risk. No ischemia or prior infarction. EF >80%. No chg from study of 11/7/13. Hypercholesterolemia     Hypertension     Mild coronary artery disease 09/20/95    Cath: Minor plaquing of the distal segment of the circumflex artery on a kink with less than 30% stenosis    Mitral valve prolapse syndrome     with mid to late systolic murmur with no click    Palpitations     S/P cardiac cath 09/20/1995    dCx 30%. Otherwise patent coronaries. Stroke [facility])     possible small embolic       Past Surgical History:   Procedure Laterality Date    HX ACL RECONSTRUCTION      left    HX CATARACT REMOVAL      bilateral    HX TOAN AND BSO      HX TONSILLECTOMY      JUNG BIOPSY BREAST STEREOTACTIC      left breast x 2 benign       Current Outpatient Medications   Medication Sig Dispense Refill    montelukast (SINGULAIR) 10 mg tablet Take 10 mg by mouth nightly. apixaban (Eliquis) 5 mg tablet Take 1 Tablet by mouth two (2) times a day. 180 Tablet 3    spironolactone (ALDACTONE) 25 mg tablet TAKE 1 TABLET BY MOUTH EVERY DAY 90 Tablet 3    carvediloL (COREG) 25 mg tablet TAKE 1 TABLET BY MOUTH TWICE A DAY WITH MEALS 180 Tablet 3    levothyroxine (SYNTHROID) 75 mcg tablet Take 75 mcg by mouth Daily (before breakfast). pioglitazone (ACTOS) 30 mg tablet Take 30 mg by mouth daily. repaglinide (PRANDIN) 2 mg tablet Take 2 mg by mouth Before breakfast, lunch, and dinner. predniSONE (DELTASONE) 5 mg tablet Take  by mouth. loteprednol etabonate (LOTEMAX) 0.5 % ophthalmic suspension Administer 1 Drop to both eyes daily. simvastatin (ZOCOR) 20 mg tablet Take 20 mg by mouth nightly.          Allergies   Allergen Reactions    Tagamet [Cimetidine] Unknown (comments)       Social History     Socioeconomic History    Marital status:      Spouse name: Not on file    Number of children: Not on file    Years of education: Not on file    Highest education level: Not on file   Occupational History    Not on file   Tobacco Use    Smoking status: Former     Packs/day: 1.00     Years: 13.00     Pack years: 13.00     Types: Cigarettes     Quit date: 5/10/1969     Years since quittin.3    Smokeless tobacco: Never   Vaping Use    Vaping Use: Never used   Substance and Sexual Activity    Alcohol use: No    Drug use: No    Sexual activity: Not Currently   Other Topics Concern    Not on file   Social History Narrative    Not on file     Social Determinants of Health     Financial Resource Strain: Not on file   Food Insecurity: Not on file   Transportation Needs: Not on file   Physical Activity: Not on file   Stress: Not on file   Social Connections: Not on file   Intimate Partner Violence: Not on file   Housing Stability: Not on file        FH: n/a    Review of Systems    14 pt Review of Systems is negative unless otherwise mentioned in the HPI. Wt Readings from Last 3 Encounters:   09/15/22 61.7 kg (136 lb)   22 62.6 kg (138 lb)   22 62.6 kg (138 lb)     Temp Readings from Last 3 Encounters:   16 97.8 °F (36.6 °C)   16 97.6 °F (36.4 °C)     BP Readings from Last 3 Encounters:   09/15/22 136/78   22 132/80   22 132/80     Pulse Readings from Last 3 Encounters:   09/15/22 72   22 77   21 66       Physical Exam:    Visit Vitals  /78 (BP 1 Location: Left upper arm, BP Patient Position: Sitting, BP Cuff Size: Small adult)   Pulse 72   Ht 5' (1.524 m)   Wt 61.7 kg (136 lb)   SpO2 97%   BMI 26.56 kg/m²      Physical Exam  HENT:      Head: Normocephalic and atraumatic. Eyes:      Pupils: Pupils are equal, round, and reactive to light. Cardiovascular:      Rate and Rhythm: Normal rate and regular rhythm. Heart sounds: Murmur heard. No friction rub. No gallop. Pulmonary:      Effort: Pulmonary effort is normal. No respiratory distress.       Breath sounds: Normal breath sounds. No wheezing or rales. Chest:      Chest wall: No tenderness. Abdominal:      General: Bowel sounds are normal.      Palpations: Abdomen is soft. Musculoskeletal:         General: No tenderness. Skin:     General: Skin is warm and dry. Neurological:      Mental Status: She is alert and oriented to person, place, and time. Device: WNL, rates well controlled    Lab Results   Component Value Date/Time    Sodium 140 04/23/2018 11:25 AM    Potassium 3.9 04/23/2018 11:25 AM    Chloride 106 04/23/2018 11:25 AM    CO2 26 04/23/2018 11:25 AM    Anion gap 8 04/23/2018 11:25 AM    Glucose 178 (H) 04/23/2018 11:25 AM    BUN 28 (H) 04/23/2018 11:25 AM    Creatinine 1.26 04/23/2018 11:25 AM    BUN/Creatinine ratio 22 (H) 04/23/2018 11:25 AM    GFR est AA 50 (L) 04/23/2018 11:25 AM    GFR est non-AA 41 (L) 04/23/2018 11:25 AM    Calcium 10.0 04/23/2018 11:25 AM    Bilirubin, total 0.4 05/27/2016 09:35 AM    Alk. phosphatase 81 05/27/2016 09:35 AM    Protein, total 6.9 05/27/2016 09:35 AM    Albumin 3.6 05/27/2016 09:35 AM    Globulin 3.3 05/27/2016 09:35 AM    A-G Ratio 1.1 05/27/2016 09:35 AM    ALT (SGPT) 29 05/27/2016 09:35 AM    AST (SGOT) 13 (L) 05/27/2016 09:35 AM         Impression and Plan:  Gely Meza is a 79 y.o. with:    1.) CAD  2.) permanent AFib  3.) SSS s/p PPM  4.) CKD ([facility])  5.) severe MR & TR  6.) PMR on steroids, known  7.) H/o CVA    1.) Doing well, device checked, no changes made  2.) RTC 6 months with device check    45 mins, with pt and her son    Thank you for allowing me to participate in the care of your patient, please do not hesitate to call with questions or concerns. Follow-up and Dispositions    Return in about 6 months (around 3/15/2023).      [address] [place] [address],    [facility] [place],

## 2022-10-10 DIAGNOSIS — I49.5 TACHYCARDIA-BRADYCARDIA SYNDROME (HCC): ICD-10-CM

## 2022-10-10 DIAGNOSIS — I48.19 PERSISTENT ATRIAL FIBRILLATION (HCC): ICD-10-CM

## 2022-10-10 NOTE — TELEPHONE ENCOUNTER
Patient son called sts Pt is completely out of the Coreg would like an Rx refilled sent to the pharmacy. Rx pended.

## 2022-10-14 RX ORDER — CARVEDILOL 25 MG/1
25 TABLET ORAL 2 TIMES DAILY WITH MEALS
Qty: 180 TABLET | Refills: 3 | Status: SHIPPED | OUTPATIENT
Start: 2022-10-14

## 2023-01-13 ENCOUNTER — TELEPHONE (OUTPATIENT)
Dept: CARDIOLOGY CLINIC | Age: 83
End: 2023-01-13

## 2023-01-13 NOTE — TELEPHONE ENCOUNTER
Patient son called [facility] patient was seen in [facility] due to brain bleed and hematoma. Son sts he wanted Dr Sharri Matt to know since she put her on Eliquis.

## 2023-03-16 ENCOUNTER — OFFICE VISIT (OUTPATIENT)
Age: 83
End: 2023-03-16
Payer: MEDICARE

## 2023-03-16 ENCOUNTER — NURSE ONLY (OUTPATIENT)
Age: 83
End: 2023-03-16
Payer: MEDICARE

## 2023-03-16 VITALS
HEIGHT: 60 IN | WEIGHT: 152 LBS | SYSTOLIC BLOOD PRESSURE: 130 MMHG | OXYGEN SATURATION: 96 % | HEART RATE: 92 BPM | DIASTOLIC BLOOD PRESSURE: 76 MMHG | BODY MASS INDEX: 29.84 KG/M2

## 2023-03-16 DIAGNOSIS — I34.1 NONRHEUMATIC MITRAL (VALVE) PROLAPSE: ICD-10-CM

## 2023-03-16 DIAGNOSIS — Z95.0 PRESENCE OF CARDIAC PACEMAKER: ICD-10-CM

## 2023-03-16 DIAGNOSIS — Z95.0 PRESENCE OF CARDIAC PACEMAKER: Primary | ICD-10-CM

## 2023-03-16 DIAGNOSIS — I49.5 TACHYCARDIA-BRADYCARDIA SYNDROME (HCC): ICD-10-CM

## 2023-03-16 DIAGNOSIS — I48.19 OTHER PERSISTENT ATRIAL FIBRILLATION (HCC): ICD-10-CM

## 2023-03-16 DIAGNOSIS — I48.91 ATRIAL FIBRILLATION WITH CONTROLLED VENTRICULAR RATE (HCC): Primary | ICD-10-CM

## 2023-03-16 PROCEDURE — G8428 CUR MEDS NOT DOCUMENT: HCPCS | Performed by: INTERNAL MEDICINE

## 2023-03-16 PROCEDURE — 1123F ACP DISCUSS/DSCN MKR DOCD: CPT | Performed by: INTERNAL MEDICINE

## 2023-03-16 PROCEDURE — G8484 FLU IMMUNIZE NO ADMIN: HCPCS | Performed by: INTERNAL MEDICINE

## 2023-03-16 PROCEDURE — 1036F TOBACCO NON-USER: CPT | Performed by: INTERNAL MEDICINE

## 2023-03-16 PROCEDURE — G8419 CALC BMI OUT NRM PARAM NOF/U: HCPCS | Performed by: INTERNAL MEDICINE

## 2023-03-16 PROCEDURE — 99214 OFFICE O/P EST MOD 30 MIN: CPT | Performed by: INTERNAL MEDICINE

## 2023-03-16 PROCEDURE — 1090F PRES/ABSN URINE INCON ASSESS: CPT | Performed by: INTERNAL MEDICINE

## 2023-03-16 PROCEDURE — G8400 PT W/DXA NO RESULTS DOC: HCPCS | Performed by: INTERNAL MEDICINE

## 2023-03-16 ASSESSMENT — ANXIETY QUESTIONNAIRES
5. BEING SO RESTLESS THAT IT IS HARD TO SIT STILL: 0
3. WORRYING TOO MUCH ABOUT DIFFERENT THINGS: 0
7. FEELING AFRAID AS IF SOMETHING AWFUL MIGHT HAPPEN: 0
GAD7 TOTAL SCORE: 0
4. TROUBLE RELAXING: 0
2. NOT BEING ABLE TO STOP OR CONTROL WORRYING: 0
6. BECOMING EASILY ANNOYED OR IRRITABLE: 0
1. FEELING NERVOUS, ANXIOUS, OR ON EDGE: 0

## 2023-03-16 ASSESSMENT — PATIENT HEALTH QUESTIONNAIRE - PHQ9
SUM OF ALL RESPONSES TO PHQ QUESTIONS 1-9: 0
2. FEELING DOWN, DEPRESSED OR HOPELESS: 0
SUM OF ALL RESPONSES TO PHQ QUESTIONS 1-9: 0
1. LITTLE INTEREST OR PLEASURE IN DOING THINGS: 0
SUM OF ALL RESPONSES TO PHQ9 QUESTIONS 1 & 2: 0
SUM OF ALL RESPONSES TO PHQ QUESTIONS 1-9: 0
SUM OF ALL RESPONSES TO PHQ QUESTIONS 1-9: 0

## 2023-03-16 NOTE — PROGRESS NOTES
Leonardo Barth presents today for   Chief Complaint   Patient presents with    Follow-up     6 month    Device Check       Leonardo Barth preferred language for health care discussion is english/other. Is someone accompanying this pt? yes    Is the patient using any DME equipment during OV? walker    Depression Screening:  Depression: Not at risk    PHQ-2 Score: 0        Learning Assessment:  Who is the primary learner? Patient    What is the preferred language for health care of the primary learner? ENGLISH    How does the primary learner prefer to learn new concepts? DEMONSTRATION    Answered By patient    Relationship to Learner SELF           Pt currently taking Anticoagulant therapy? Eliquis 5 mg bid    Pt currently taking Antiplatelet therapy ? no      Coordination of Care:  1. Have you been to the ER, urgent care clinic since your last visit? Hospitalized since your last visit? no    2. Have you seen or consulted any other health care providers outside of the [address] [place] [address] since your last visit? Include any pap smears or colon screening.  nono

## 2023-03-16 NOTE — PROGRESS NOTES
Jarek Patel    F/u CAD, pAFib/ SSS    HPI    Jarek Patel is a 79 y.o. with coronary disease, chronic A. Fib (high [id number] with h/o CVA), sick sinus syndrome status post PPM, CKD, mitral valve prolapse, polymyalgia rheumatica on steroids. She has a history of palpitations and mild coronary artery disease and was followed by Dr. Charlie Burns for years. She had a cardiac catheterization on September 20, 1995, which demonstrated:    Patent left main trunk. Patent LAD. Minor plaquing of the distal segment of the circumflex artery on a kink with less than 30% stenosis. Patent nondominant right coronary artery with very mild diffuse disease. Normal left ventricle. She has had a mid to late systolic murmur at the apex with no click, and the diagnosis of mitral valve prolapse and mild mitral regurgitation was established clinically as well as by echocardiographic findings. She had a positive treadmill stress test but negative thallium findings. She has some spotting in her vision in the right eye, for which she has had multiple diagnostic procedures, including an MRI scan, and was told that she might have had an embolic stroke or a mild hemorrhage with the hypertension. She has been on Plavix since then. She had an adenosine Cardiolite myocardial perfusion scan on 02/22/05, at which time she had 2 mm ST depression on electrocardiogram with adenosine only, but the perfusion study was normal. The gated SPECT imaging revealed EF in the 70% range. Because of the EKG abnormalities with the adenosine, the possibility of balanced ischemia with multi-vessel coronary disease was entertained and subsequently, she had a stress echocardiogram on 03/16/05, at which time she exercised 8 minutes and 41 seconds with no chest pain, but developed 2 mm of ST depression.  There was no evidence of wall motion abnormalities in the presence of the ST depression and LV function remained normal. It was felt that the nuclear imaging and echocardiographic findings were normal with the EKG abnormalities. Therefore, there was no clear-cut clinical evidence of ischemia. She has been continued on medical treatment. She had an echocardiogram in January 2008 which demonstrated normal LV function with EF in the 70-75% range. There was mild to moderate mitral regurgitation. She also had an Adenosine Cardiolite myocardial perfusion scan which demonstrated normal perfusion imaging and normal LV function. She had repeat stress nuclear cardiac imaging on September 20, 2010 which demonstrated normal perfusion and normal LV function. She underwent stress nuclear cardiac imaging on 11/6/13 which demonstrated normal perfusion with no scaring or ischemia. She did have positive EKG changes without chest pain with exercise. The ejection fraction was estimated in the 80% range. She had followup stress nuclear cardiac imaging on 12/02/15, which demonstrated normal perfusion with no evidence of scarring or ischemia. Ejection fraction was greater than 80% with a hyperdynamic ventricle. Because of the frequent bradycardia with pauses on the Holter monitor on 05/19/16, we reduced the betablocker with no improvement. She developed a racing heartbeat and was found to have atrial fibrillation when she was seen in the emergency room on 04/24/16. She was treated with atenolol 100 mg daily, which had to be reduced because of frequent pauses on the Holter monitor. But, a subsequent Holter monitor continued to reveal frequent long pauses with baseline atrial fibrillation. She subsequently underwent permanent pacemaker implantation on 05/31/16 with a dual chamber MRI compatible Medtronic pacemaker. She had been on NOAC since the time of diagnosis. She underwent stress nuclear cardiac imaging on 4/22/19 which demonstrated normal perfusion with no evidence of scarring or ischemia. EF was estimated at 71%.      I took over her care in 2020, overall she did well and had no complaints. Son says she follows with neuro geriatrics at [facility], an MRI of her brain apparently showed some small areas of bleeding?- He says he inquired if continuing the Eliquis was okay and was told yes. Past Medical History:   Diagnosis Date    History of echocardiogram 01/02/2008    EF 70-75%. Mild-mod MR. Mod TR. PASP 40 mmHg. History of myocardial perfusion scan 12/02/2015    Low risk. No ischemia or prior infarction. EF >80%. No chg from study of 11/7/13. Hypercholesterolemia     Hypertension     Mild coronary artery disease 09/20/95    Cath: Minor plaquing of the distal segment of the circumflex artery on a kink with less than 30% stenosis    Mitral valve prolapse syndrome     with mid to late systolic murmur with no click    Palpitations     S/P cardiac cath 09/20/1995    dCx 30%. Otherwise patent coronaries. Stroke [facility])     possible small embolic       Past Surgical History:   Procedure Laterality Date    HX ACL RECONSTRUCTION      left    HX CATARACT REMOVAL      bilateral    HX DOMINGO AND BSO      HX TONSILLECTOMY      MICHELLE BIOPSY BREAST STEREOTACTIC      left breast x 2 benign       Current Outpatient Medications   Medication Sig Dispense Refill    montelukast (SINGULAIR) 10 mg tablet Take 10 mg by mouth nightly. apixaban (Eliquis) 5 mg tablet Take 1 Tablet by mouth two (2) times a day. 180 Tablet 3    spironolactone (ALDACTONE) 25 mg tablet TAKE 1 TABLET BY MOUTH EVERY DAY 90 Tablet 3    carvediloL (COREG) 25 mg tablet TAKE 1 TABLET BY MOUTH TWICE A DAY WITH MEALS 180 Tablet 3    levothyroxine (SYNTHROID) 75 mcg tablet Take 75 mcg by mouth Daily (before breakfast). pioglitazone (ACTOS) 30 mg tablet Take 30 mg by mouth daily. repaglinide (PRANDIN) 2 mg tablet Take 2 mg by mouth Before breakfast, lunch, and dinner. predniSONE (DELTASONE) 5 mg tablet Take  by mouth.       loteprednol etabonate (LOTEMAX) 0.5 % ophthalmic suspension Administer 1 Drop to both eyes daily. simvastatin (ZOCOR) 20 mg tablet Take 20 mg by mouth nightly. Allergies   Allergen Reactions    Tagamet [Cimetidine] Unknown (comments)       Social History     Socioeconomic History    Marital status:      Spouse name: Not on file    Number of children: Not on file    Years of education: Not on file    Highest education level: Not on file   Occupational History    Not on file   Tobacco Use    Smoking status: Former     Packs/day: 1.00     Years: 13.00     Pack years: 13.00     Types: Cigarettes     Quit date: 5/10/1969     Years since quittin.3    Smokeless tobacco: Never   Vaping Use    Vaping Use: Never used   Substance and Sexual Activity    Alcohol use: No    Drug use: No    Sexual activity: Not Currently   Other Topics Concern    Not on file   Social History Narrative    Not on file     Social Determinants of Health     Financial Resource Strain: Not on file   Food Insecurity: Not on file   Transportation Needs: Not on file   Physical Activity: Not on file   Stress: Not on file   Social Connections: Not on file   Intimate Partner Violence: Not on file   Housing Stability: Not on file        FH: n/a    Review of Systems    14 pt Review of Systems is negative unless otherwise mentioned in the HPI.     Wt Readings from Last 3 Encounters:   09/15/22 61.7 kg (136 lb)   22 62.6 kg (138 lb)   22 62.6 kg (138 lb)     Temp Readings from Last 3 Encounters:   16 97.8 °F (36.6 °C)   16 97.6 °F (36.4 °C)     BP Readings from Last 3 Encounters:   09/15/22 136/78   22 132/80   22 132/80     Pulse Readings from Last 3 Encounters:   09/15/22 72   22 77   21 66       Physical Exam:    Visit Vitals  /78 (BP 1 Location: Left upper arm, BP Patient Position: Sitting, BP Cuff Size: Small adult)   Pulse 72   Ht 5' (1.524 m)   Wt 61.7 kg (136 lb)   SpO2 97%   BMI 26.56 kg/m²      Physical Exam  HENT:      Head: Normocephalic and atraumatic. Eyes:      Pupils: Pupils are equal, round, and reactive to light. Cardiovascular:      Rate and Rhythm: Normal rate and regular rhythm. Heart sounds: Murmur heard. No friction rub. No gallop. Pulmonary:      Effort: Pulmonary effort is normal. No respiratory distress. Breath sounds: Normal breath sounds. No wheezing or rales. Chest:      Chest wall: No tenderness. Abdominal:      General: Bowel sounds are normal.      Palpations: Abdomen is soft. Musculoskeletal:         General: No tenderness. Skin:     General: Skin is warm and dry. Neurological:      Mental Status: She is alert and oriented to person, place, and time. Device: WNL, rates well controlled    Lab Results   Component Value Date/Time    Sodium 140 04/23/2018 11:25 AM    Potassium 3.9 04/23/2018 11:25 AM    Chloride 106 04/23/2018 11:25 AM    CO2 26 04/23/2018 11:25 AM    Anion gap 8 04/23/2018 11:25 AM    Glucose 178 (H) 04/23/2018 11:25 AM    BUN 28 (H) 04/23/2018 11:25 AM    Creatinine 1.26 04/23/2018 11:25 AM    BUN/Creatinine ratio 22 (H) 04/23/2018 11:25 AM    GFR est AA 50 (L) 04/23/2018 11:25 AM    GFR est non-AA 41 (L) 04/23/2018 11:25 AM    Calcium 10.0 04/23/2018 11:25 AM    Bilirubin, total 0.4 05/27/2016 09:35 AM    Alk.  phosphatase 81 05/27/2016 09:35 AM    Protein, total 6.9 05/27/2016 09:35 AM    Albumin 3.6 05/27/2016 09:35 AM    Globulin 3.3 05/27/2016 09:35 AM    A-G Ratio 1.1 05/27/2016 09:35 AM    ALT (SGPT) 29 05/27/2016 09:35 AM    AST (SGOT) 13 (L) 05/27/2016 09:35 AM         Impression and Plan:  Charleen Oconnor is a 79 y.o. with:    1.) CAD  2.) permanent AFib  3.) SSS s/p PPM  4.) CKD ([facility])  5.) severe MR & TR  6.) PMR on steroids, known  7.) H/o CVA/ recent MRI (neuro geriatrics Dr. Cyndie Helton)    Continue current meds  RTC 6 months with device check, will need gen change in about 8 months, dw pt and son if want to consider watchman at that time so we can come off NOAC long term due to her increasing bleeding risk  Gave them info and can discuss further next time    Thank you for allowing me to participate in the care of your patient, please do not hesitate to call with questions or concerns. Follow-up and Dispositions    Return in about 6 months (around 3/15/2023).      [address],    Kilo Ruiz, DO

## 2023-03-20 PROCEDURE — 93288 INTERROG EVL PM/LDLS PM IP: CPT | Performed by: INTERNAL MEDICINE

## 2023-10-25 ENCOUNTER — NURSE ONLY (OUTPATIENT)
Age: 83
End: 2023-10-25

## 2023-10-25 ENCOUNTER — OFFICE VISIT (OUTPATIENT)
Age: 83
End: 2023-10-25
Payer: MEDICARE

## 2023-10-25 VITALS
HEIGHT: 60 IN | BODY MASS INDEX: 29.84 KG/M2 | SYSTOLIC BLOOD PRESSURE: 148 MMHG | DIASTOLIC BLOOD PRESSURE: 98 MMHG | HEART RATE: 66 BPM | WEIGHT: 152 LBS | OXYGEN SATURATION: 100 %

## 2023-10-25 DIAGNOSIS — I48.91 ATRIAL FIBRILLATION WITH CONTROLLED VENTRICULAR RATE (HCC): Primary | ICD-10-CM

## 2023-10-25 DIAGNOSIS — Z95.0 PRESENCE OF CARDIAC PACEMAKER: Primary | ICD-10-CM

## 2023-10-25 DIAGNOSIS — Z95.0 PRESENCE OF CARDIAC PACEMAKER: ICD-10-CM

## 2023-10-25 DIAGNOSIS — I34.1 NONRHEUMATIC MITRAL (VALVE) PROLAPSE: ICD-10-CM

## 2023-10-25 DIAGNOSIS — I49.5 TACHYCARDIA-BRADYCARDIA SYNDROME (HCC): ICD-10-CM

## 2023-10-25 PROCEDURE — G8400 PT W/DXA NO RESULTS DOC: HCPCS | Performed by: INTERNAL MEDICINE

## 2023-10-25 PROCEDURE — G8417 CALC BMI ABV UP PARAM F/U: HCPCS | Performed by: INTERNAL MEDICINE

## 2023-10-25 PROCEDURE — G8427 DOCREV CUR MEDS BY ELIG CLIN: HCPCS | Performed by: INTERNAL MEDICINE

## 2023-10-25 PROCEDURE — 1036F TOBACCO NON-USER: CPT | Performed by: INTERNAL MEDICINE

## 2023-10-25 PROCEDURE — 1123F ACP DISCUSS/DSCN MKR DOCD: CPT | Performed by: INTERNAL MEDICINE

## 2023-10-25 PROCEDURE — 1090F PRES/ABSN URINE INCON ASSESS: CPT | Performed by: INTERNAL MEDICINE

## 2023-10-25 PROCEDURE — 93000 ELECTROCARDIOGRAM COMPLETE: CPT | Performed by: INTERNAL MEDICINE

## 2023-10-25 PROCEDURE — G8484 FLU IMMUNIZE NO ADMIN: HCPCS | Performed by: INTERNAL MEDICINE

## 2023-10-25 RX ORDER — CYCLOSPORINE 0.5 MG/ML
1 EMULSION OPHTHALMIC 2 TIMES DAILY
COMMUNITY

## 2023-10-25 RX ORDER — ERGOCALCIFEROL 1.25 MG/1
50000 CAPSULE ORAL WEEKLY
COMMUNITY

## 2023-10-25 RX ORDER — DONEPEZIL HYDROCHLORIDE 10 MG/1
10 TABLET, FILM COATED ORAL NIGHTLY
COMMUNITY
Start: 2023-10-19

## 2023-10-25 ASSESSMENT — PATIENT HEALTH QUESTIONNAIRE - PHQ9
SUM OF ALL RESPONSES TO PHQ QUESTIONS 1-9: 0
2. FEELING DOWN, DEPRESSED OR HOPELESS: 0
SUM OF ALL RESPONSES TO PHQ QUESTIONS 1-9: 0
SUM OF ALL RESPONSES TO PHQ QUESTIONS 1-9: 0
1. LITTLE INTEREST OR PLEASURE IN DOING THINGS: 0
SUM OF ALL RESPONSES TO PHQ QUESTIONS 1-9: 0
SUM OF ALL RESPONSES TO PHQ9 QUESTIONS 1 & 2: 0

## 2023-10-25 NOTE — PROGRESS NOTES
Madi Sabrina presents today for   Chief Complaint   Patient presents with    Follow-up     7 month f/u     Device Check     Medtronic     Palpitations     Racing palps on/off       Madi Bennett preferred language for health care discussion is english/other. Is someone accompanying this pt? yes    Is the patient using any DME equipment during OV? yes    Depression Screening:  Depression: Not at risk (10/25/2023)    PHQ-2     PHQ-2 Score: 0        Learning Assessment:  Who is the primary learner? Patient    What is the preferred language for health care of the primary learner? ENGLISH    How does the primary learner prefer to learn new concepts? DEMONSTRATION    Answered By patient    Relationship to Learner SELF           Pt currently taking Anticoagulant therapy? Eliquis 5 mg 2x daily     Pt currently taking Antiplatelet therapy ? no      Coordination of Care:  1. Have you been to the ER, urgent care clinic since your last visit? Hospitalized since your last visit? no    2. Have you seen or consulted any other health care providers outside of the [address] [place] [address] since your last visit? Include any pap smears or colon screening.  no

## 2023-10-25 NOTE — PROGRESS NOTES
Katharine Martin    F/u CAD, pAFib/ SSS    HPI    Katharine Martin is a 79 y.o. with coronary disease, chronic A. Fib (high [id number] with h/o CVA), sick sinus syndrome status post PPM, CKD, mitral valve prolapse, polymyalgia rheumatica on steroids. She has a history of palpitations and mild coronary artery disease and was followed by Dr. Axel Hurst for years. She had a cardiac catheterization on September 20, 1995, which demonstrated:    Patent left main trunk. Patent LAD. Minor plaquing of the distal segment of the circumflex artery on a kink with less than 30% stenosis. Patent nondominant right coronary artery with very mild diffuse disease. Normal left ventricle. She has had a mid to late systolic murmur at the apex with no click, and the diagnosis of mitral valve prolapse and mild mitral regurgitation was established clinically as well as by echocardiographic findings. She had a positive treadmill stress test but negative thallium findings. She has some spotting in her vision in the right eye, for which she has had multiple diagnostic procedures, including an MRI scan, and was told that she might have had an embolic stroke or a mild hemorrhage with the hypertension. She has been on Plavix since then. She had an adenosine Cardiolite myocardial perfusion scan on 02/22/05, at which time she had 2 mm ST depression on electrocardiogram with adenosine only, but the perfusion study was normal. The gated SPECT imaging revealed EF in the 70% range. Because of the EKG abnormalities with the adenosine, the possibility of balanced ischemia with multi-vessel coronary disease was entertained and subsequently, she had a stress echocardiogram on 03/16/05, at which time she exercised 8 minutes and 41 seconds with no chest pain, but developed 2 mm of ST depression.  There was no evidence of wall motion abnormalities in the presence of the ST depression and LV function remained normal. It was felt that the nuclear

## 2023-10-28 NOTE — RESULT ENCOUNTER NOTE
Device check personally reviewed by me. Normal device function on interrogation. Device approaching elective replacement time, recheck in 1 month see scanned interrogation document for complete details.

## 2023-12-28 ENCOUNTER — NURSE ONLY (OUTPATIENT)
Age: 83
End: 2023-12-28

## 2023-12-28 DIAGNOSIS — Z95.0 PRESENCE OF CARDIAC PACEMAKER: Primary | ICD-10-CM

## 2023-12-28 DIAGNOSIS — I49.5 TACHYCARDIA-BRADYCARDIA SYNDROME (HCC): ICD-10-CM

## 2024-01-04 NOTE — RESULT ENCOUNTER NOTE
Device check personally reviewed by me.  Device reached elective replacement indicator.  Agree with scheduling generator exchange.  See scanned interrogation document for complete details.

## 2024-01-11 ENCOUNTER — TELEPHONE (OUTPATIENT)
Age: 84
End: 2024-01-11

## 2024-01-11 DIAGNOSIS — I48.91 ATRIAL FIBRILLATION WITH CONTROLLED VENTRICULAR RATE (HCC): ICD-10-CM

## 2024-01-11 DIAGNOSIS — I49.5 TACHYCARDIA-BRADYCARDIA SYNDROME (HCC): ICD-10-CM

## 2024-01-11 DIAGNOSIS — Z45.010 ENCOUNTER FOR PACEMAKER AT END OF BATTERY LIFE: Primary | ICD-10-CM

## 2024-01-11 NOTE — TELEPHONE ENCOUNTER
----- Message from Marsha Gambino sent at 1/2/2024 11:06 AM EST -----  Regarding: instructions  She is scheduled for a Dual Pacemaker Gen Change on 1/24/24 @ 8:00 with Dr. De La Rosa.  Please call her son with instructions after Jan 9, he will be out of the country.  Thanks    
please go ahead and remove them.                                     No swimming, soaking in bath tub or hot tubs for 2 weeks    You will be scheduled an appointment in our office in 7-10 days for a wound check.  This visit will be with one of the nurses.    If you develop a fever over 100.5 F , note drainage from the incision or if you develop increased pain or swelling at the incision site or pain or swelling in the arm, PLEASE CALL the office at [phone] and speak with one of the nurses.    For the next two weeks, please do not raise your arm (on the side the device was placed) above your shoulder and do not lift/carry more than 5 pounds with that arm.  If is ok to use your arm otherwise.    You should not play golf, tennis, swim using an overhead stroke, or engage in any activity that will involve repetitive movement of your arm for 6 weeks.  Weight lifting with that arm (chest fly, chest presses, etc.) can increase the risk of damage to one of the leads (wires).    If you did not faint (pass out) prior to the device implant, you may resume driving in 2 weeks.    IF YOU DID FAINT (PASS OUT), YOU SHOULD NOT DRIVE FOR 6 MONTHS OR UNTIL YOU ARE CLEARED BY THE CARDIOLOGIST TO DRIVE AGAIN.    IF YOU ARE SCHEDULED FOR ANY SURGICAL PROCEDURE, PLEASE MAKE SURE THE DOCTOR IS AWARE THAT YOU HAVE A PACEMAKER/AICD.    Although infrequently, strong electrical fields can interfere with your pacemaker.  Home appliances (microwaves and home shop equipment) should be ok to use if they are in good repair.  High voltage equipment (welding equipment, induction furnaces, ham radio equipment) can be a problem and you should not use, or be close by, those particular devices.  There is more information in the booklet provided with you pacemaker/AICD.    Cell phones are ok to use but use them on the side opposite your pacemaker/AICD and don't put them in the shirt pocket over your pacemaker.    You will be scheduled to have a

## 2024-01-18 LAB
ANION GAP SERPL CALCULATED.3IONS-SCNC: 11 MMOL/L (ref 3–15)
BUN BLDV-MCNC: 20 MG/DL (ref 6–22)
CALCIUM SERPL-MCNC: 9.6 MG/DL (ref 8.4–10.5)
CHLORIDE BLD-SCNC: 104 MMOL/L (ref 98–110)
CO2: 23 MMOL/L (ref 20–32)
CREAT SERPL-MCNC: 1 MG/DL (ref 0.8–1.4)
GLOMERULAR FILTRATION RATE: 52.4 ML/MIN/1.73 SQ.M.
GLUCOSE: 118 MG/DL (ref 70–99)
HCT VFR BLD CALC: 38.1 % (ref 35.1–48.3)
HEMOGLOBIN: 12.2 G/DL (ref 11.7–16.1)
INR BLD: 1.13 (ref 0.89–1.29)
MCH RBC QN AUTO: 30 PG (ref 26–34)
MCHC RBC AUTO-ENTMCNC: 32 G/DL (ref 31–36)
MCV RBC AUTO: 95 FL (ref 80–99)
PDW BLD-RTO: 17.2 % (ref 10–15.5)
PLATELET # BLD: 172 K/UL (ref 140–440)
PMV BLD AUTO: 10.3 FL (ref 9–13)
POTASSIUM SERPL-SCNC: 4.6 MMOL/L (ref 3.5–5.5)
PROTHROMBIN TIME: 12.1 SEC (ref 9–13)
RBC: 4.02 M/UL (ref 3.8–5.2)
SODIUM BLD-SCNC: 138 MMOL/L (ref 133–145)
WBC: 5.4 K/UL (ref 4–11)

## 2024-01-24 ENCOUNTER — HOSPITAL ENCOUNTER (OUTPATIENT)
Facility: HOSPITAL | Age: 84
Setting detail: OUTPATIENT SURGERY
Discharge: HOME | End: 2024-01-24
Attending: INTERNAL MEDICINE | Admitting: INTERNAL MEDICINE
Payer: MEDICARE

## 2024-01-24 VITALS
HEART RATE: 62 BPM | WEIGHT: 141.5 LBS | HEIGHT: 63 IN | SYSTOLIC BLOOD PRESSURE: 123 MMHG | DIASTOLIC BLOOD PRESSURE: 103 MMHG | BODY MASS INDEX: 25.07 KG/M2 | RESPIRATION RATE: 13 BRPM | OXYGEN SATURATION: 95 %

## 2024-01-24 DIAGNOSIS — Z45.010 ENCOUNTER FOR PACEMAKER AT END OF BATTERY LIFE: ICD-10-CM

## 2024-01-24 LAB — ECHO BSA: 1.69 M2

## 2024-01-24 PROCEDURE — 2500000003 HC RX 250 WO HCPCS: Performed by: INTERNAL MEDICINE

## 2024-01-24 PROCEDURE — 6360000002 HC RX W HCPCS: Performed by: INTERNAL MEDICINE

## 2024-01-24 PROCEDURE — 2720000010 HC SURG SUPPLY STERILE: Performed by: INTERNAL MEDICINE

## 2024-01-24 PROCEDURE — 33213 INSERT PULSE GEN DUAL LEADS: CPT | Performed by: INTERNAL MEDICINE

## 2024-01-24 PROCEDURE — 2709999900 HC NON-CHARGEABLE SUPPLY: Performed by: INTERNAL MEDICINE

## 2024-01-24 PROCEDURE — C1785 PMKR, DUAL, RATE-RESP: HCPCS | Performed by: INTERNAL MEDICINE

## 2024-01-24 RX ORDER — FENTANYL CITRATE 50 UG/ML
INJECTION, SOLUTION INTRAMUSCULAR; INTRAVENOUS PRN
Status: DISCONTINUED | OUTPATIENT
Start: 2024-01-24 | End: 2024-01-24 | Stop reason: HOSPADM

## 2024-01-24 RX ORDER — TRAMADOL HYDROCHLORIDE 50 MG/1
50 TABLET ORAL EVERY 6 HOURS PRN
OUTPATIENT
Start: 2024-01-24

## 2024-01-24 RX ORDER — TRAMADOL HYDROCHLORIDE 50 MG/1
100 TABLET ORAL EVERY 6 HOURS PRN
OUTPATIENT
Start: 2024-01-24

## 2024-01-24 RX ORDER — MIDAZOLAM HYDROCHLORIDE 1 MG/ML
INJECTION INTRAMUSCULAR; INTRAVENOUS PRN
Status: DISCONTINUED | OUTPATIENT
Start: 2024-01-24 | End: 2024-01-24 | Stop reason: HOSPADM

## 2024-01-24 RX ORDER — ACETAMINOPHEN 325 MG/1
650 TABLET ORAL EVERY 4 HOURS PRN
OUTPATIENT
Start: 2024-01-24

## 2024-01-24 RX ORDER — CEFAZOLIN SODIUM 1 G/3ML
INJECTION, POWDER, FOR SOLUTION INTRAMUSCULAR; INTRAVENOUS PRN
Status: DISCONTINUED | OUTPATIENT
Start: 2024-01-24 | End: 2024-01-24 | Stop reason: HOSPADM

## 2024-01-24 RX ORDER — TRAMADOL HYDROCHLORIDE 50 MG/1
50 TABLET ORAL EVERY 6 HOURS PRN
Qty: 12 TABLET | Refills: 0 | Status: SHIPPED | OUTPATIENT
Start: 2024-01-24 | End: 2024-01-27

## 2024-01-24 NOTE — H&P
ANKIT Sandoval is a 82 y.o. with coronary disease, chronic A. Fib (high [id number] with h/o CVA), sick sinus syndrome status post PPM, CKD, mitral valve prolapse, polymyalgia rheumatica on steroids.      She has a history of palpitations and mild coronary artery disease and was followed by Dr. Corbett for years. She had a cardiac catheterization on September 20, 1995, which demonstrated:    Patent left main trunk.  Patent LAD.  Minor plaquing of the distal segment of the circumflex artery on a kink with less than 30% stenosis.  Patent nondominant right coronary artery with very mild diffuse disease.    Normal left ventricle.   She has had a mid to late systolic murmur at the apex with no click, and the diagnosis of mitral valve prolapse and mild mitral regurgitation was established clinically as well as by echocardiographic findings. She had a positive treadmill stress test but negative thallium findings.    She has some spotting in her vision in the right eye, for which she has had multiple diagnostic procedures, including an MRI scan, and was told that she might have had an embolic stroke or a mild hemorrhage with the hypertension. She has been on Plavix since then. She had an adenosine Cardiolite myocardial perfusion scan on 02/22/05, at which time she had 2 mm ST depression on electrocardiogram with adenosine only, but the perfusion study was normal. The gated SPECT imaging revealed EF in the 70% range. Because of the EKG abnormalities with the adenosine, the possibility of balanced ischemia with multi-vessel coronary disease was entertained and subsequently, she had a stress echocardiogram on 03/16/05, at which time she exercised 8 minutes and 41 seconds with no chest pain, but developed 2 mm of ST depression. There was no evidence of wall motion abnormalities in the presence of the ST depression and LV function remained normal. It was felt that the nuclear imaging and echocardiographic findings were

## 2024-01-24 NOTE — PROGRESS NOTES
Cath holding summary:    0721: Patient ambulated from waiting area without difficulty, placed on monitor 2. A&O x4, no c/o pain. ID, NPO status, allergies verified. H&P reviewed, med rec completed. PIV x1 inserted, blood sent to lab. Groin prep completed, consent ready for signature.    0819: Verbal report given to Paulette on Latricia Sandoval being transferred to EP Lab for ordered procedure. Report consisted of patient's Situation, Background, Assessment and Recommendations (SBAR). Information from the following report(s) Adult Overview, Intake/Output, MAR, and Recent Results was reviewed with the receiving nurse. Opportunity for questions and clarification was provided.    0944: Verbal report received from Gosia Sandoval being received from EP Lab for routine post-op. Report consisted of patient's Situation, Background, Assessment and Recommendations (SBAR). Information from the following report(s) Adult Overview, Surgery Report, Intake/Output, and MAR was reviewed with the receiving nurse. Pt A&O x4, no c/o pain. Opportunity for questions and clarification provided.  Procedure: Pacemaker/ICD  Intervention: N/A  Site: Left, Chest      1142: Patient son picked up medication at outpatient pharmacy. AVS Discharge instructions reviewed with patient and patient's son, all questions answered. Patient and son verbalized understanding, copy given. Procedural site within normal limits, PIV removed. No hematoma or bleeding noted from procedural or IV site. A&Ox4 no c/o pain, discharged with support person in stable condition. Escorted out to vehicle for transport home.

## 2024-01-24 NOTE — PRE SEDATION
Sedation Plan  ASA: class 3 - patient with severe systemic disease     Mallampati class: II - soft palate, uvula, fauces visible.    Sedation plan: moderate (conscious sedation)    Risks, benefits, and alternatives discussed with patient.  Use of blood products discussed with patient who consented to blood products.       Immediate reassessment prior to sedation:  Patient's status reviewed and vital signs assessed; acceptable to perform procedure and proceed to administer sedation as planned.

## 2024-01-24 NOTE — DISCHARGE INSTRUCTIONS
Resume Eliquis tomorrow morning, 1/25 at previous dosage.      Post Op Instructions Following Pacemaker Implantation    Care of Your Incision  The dressing over your incision will be removed at your 1 week post op check.  Keep the incision site dry for one week. Do not shower. Use a hand-held shower or take a shallow bath (be mindful that the dressing needs to stay dry). Do not submerge/soak in pools or tubs for 4 weeks.  After the first week office check up, you may shower and get the incision wet. You may let soap and water run down the incision and pat it dry.   Please do not apply any lotions, ointments, creams, or powders on or near the incision.   Do not apply a bandage after they remove the original dressing, the incision should be open to air.     If you notice any of the following notify the cardiology office immediately  Signs or symptoms of infection such as fever over 100F, warmth and or redness at the incision site.   Pain around the site that gets worse.  Bleeding or drainage from the incision.  Severe swelling around the incision site.  Swelling in the arm on the side of incision.  Chest pain or shortness of breath.     Activity  Do not lift the affected arm over your head for 2 weeks.  Do not lift, push, or pull more than 10lbs for 6 weeks.  Do not drive for 2 weeks or as directed by your doctor.  Do not do any vigorous activity involving your arms, such as golfing, tennis, mowing the law, etc for 4 weeks.  If you work you may return to work in about one week, dependent on your type of work.    Post-Operative Appointments  After your discharge, an appointment will be scheduled to see the device nurse approximately 1 week after the procedure.   At the 1 week appointment, the dressing will be removed and the incision inspected to make sure it is healing well. In addition, the device will be checked to make sure it is functioning appropriately.  Another follow up will be made for a 3 month post op check

## 2024-02-01 ENCOUNTER — NURSE ONLY (OUTPATIENT)
Age: 84
End: 2024-02-01

## 2024-02-01 DIAGNOSIS — Z95.0 PACEMAKER: Primary | ICD-10-CM

## 2024-02-01 NOTE — PROGRESS NOTES
Wound check completed. Incision clean and dry. The area was without redness and swelling no drainage was noted. Pt was educated on s/s of infection and she was also instructed not to scrub when showering and to pat area dry. Pt and son verbalized full understanding.

## 2024-02-06 RX ORDER — SPIRONOLACTONE 25 MG/1
25 TABLET ORAL DAILY
Qty: 90 TABLET | Refills: 3 | Status: SHIPPED | OUTPATIENT
Start: 2024-02-06

## 2024-02-06 RX ORDER — SIMVASTATIN 20 MG
20 TABLET ORAL NIGHTLY
Qty: 90 TABLET | Refills: 3 | Status: SHIPPED | OUTPATIENT
Start: 2024-02-06

## 2024-08-09 NOTE — PROGRESS NOTES
Latricia Sandoval presents today for an overdue 6 month check-up.  She was last seen by Dr. Alaniz on 10/25/23.    Since her last visit, she states that she has been doing well.  She underwent a pacemaker generator change in Jan. 2024.  She lives at an Assisted Living facility in [place] that she \"loves.\"  She remains as physically active as she can.  She offers no cardiac complaints.    She is an 84 year old female with CAD, chronic AFIB (okay to continue NOAC per neurology), SSS (s/p permanent pacemaker, dual  change done in Jan. 2024), chronic kidney disease, mitral valve prolapse, polymyalgia rheumatica (on steroids), palpitations.  She underwent stress nuclear cardiac imaging on 4/22/19 which demonstrated normal perfusion with no evidence of scarring or ischemia. EF was estimated at 71%.     She underwent a cardiac catheterization on September 20, 1995, which demonstrated:    Patent left main trunk  Patent LAD  Minor plaquing of the distal segment of the circumflex artery on a kink with less than 30% stenosis  Patent nondominant RCA with very mild diffuse disease  Normal left ventricle    Her last echo was done on 5/5/22 and it showed an EF of 55-60%, normal wall thickness, normal wall motion, severe TR, RVSP moderately elevated at 47 mmHg, severe MR, mild aortic regurgitation.    Latricia Sandoval is a 84 y.o. female presents today for :  Chief Complaint   Patient presents with    Follow-up     6 month       PMH:  Past Medical History:   Diagnosis Date    Benign hypertensive heart disease without heart failure     Coronary atherosclerosis of native coronary artery 09/20/95    Cath: Minor plaquing of the distal segment of the circumflex artery on a kink with less than 30% stenosis    History of echocardiogram 01/02/2008    EF 70-75%.  Mild-mod MR.  Mod TR.  PASP 40 mmHg.     History of myocardial perfusion scan 12/02/2015    Low risk.  No ischemia or prior infarction.  EF >80%.  No chg from

## 2024-08-28 ENCOUNTER — OFFICE VISIT (OUTPATIENT)
Age: 84
End: 2024-08-28
Payer: MEDICARE

## 2024-08-28 ENCOUNTER — NURSE ONLY (OUTPATIENT)
Age: 84
End: 2024-08-28

## 2024-08-28 VITALS
SYSTOLIC BLOOD PRESSURE: 132 MMHG | BODY MASS INDEX: 26.93 KG/M2 | HEIGHT: 63 IN | DIASTOLIC BLOOD PRESSURE: 76 MMHG | HEART RATE: 75 BPM | OXYGEN SATURATION: 98 % | WEIGHT: 152 LBS

## 2024-08-28 DIAGNOSIS — I48.91 ATRIAL FIBRILLATION WITH CONTROLLED VENTRICULAR RATE (HCC): Primary | ICD-10-CM

## 2024-08-28 DIAGNOSIS — I49.5 TACHYCARDIA-BRADYCARDIA SYNDROME (HCC): ICD-10-CM

## 2024-08-28 DIAGNOSIS — Z95.0 PRESENCE OF CARDIAC PACEMAKER: ICD-10-CM

## 2024-08-28 DIAGNOSIS — Z95.0 PRESENCE OF CARDIAC PACEMAKER: Primary | ICD-10-CM

## 2024-08-28 DIAGNOSIS — I34.1 NONRHEUMATIC MITRAL (VALVE) PROLAPSE: ICD-10-CM

## 2024-08-28 DIAGNOSIS — I48.91 ATRIAL FIBRILLATION WITH CONTROLLED VENTRICULAR RATE (HCC): ICD-10-CM

## 2024-08-28 DIAGNOSIS — I10 ESSENTIAL (PRIMARY) HYPERTENSION: ICD-10-CM

## 2024-08-28 DIAGNOSIS — E78.5 HYPERLIPIDEMIA, UNSPECIFIED HYPERLIPIDEMIA TYPE: ICD-10-CM

## 2024-08-28 PROBLEM — E11.9 DIABETES MELLITUS (HCC): Status: ACTIVE | Noted: 2020-06-14

## 2024-08-28 PROBLEM — E83.52 HYPERCALCEMIA: Status: ACTIVE | Noted: 2020-06-14

## 2024-08-28 PROBLEM — E03.9 HYPOTHYROIDISM: Status: ACTIVE | Noted: 2020-06-14

## 2024-08-28 PROBLEM — M47.812 CERVICAL SPONDYLOSIS WITHOUT MYELOPATHY: Status: ACTIVE | Noted: 2020-06-14

## 2024-08-28 PROCEDURE — 99214 OFFICE O/P EST MOD 30 MIN: CPT | Performed by: NURSE PRACTITIONER

## 2024-08-28 PROCEDURE — 1123F ACP DISCUSS/DSCN MKR DOCD: CPT | Performed by: NURSE PRACTITIONER

## 2024-08-28 PROCEDURE — G8400 PT W/DXA NO RESULTS DOC: HCPCS | Performed by: NURSE PRACTITIONER

## 2024-08-28 PROCEDURE — 93000 ELECTROCARDIOGRAM COMPLETE: CPT | Performed by: NURSE PRACTITIONER

## 2024-08-28 PROCEDURE — 1036F TOBACCO NON-USER: CPT | Performed by: NURSE PRACTITIONER

## 2024-08-28 PROCEDURE — 1090F PRES/ABSN URINE INCON ASSESS: CPT | Performed by: NURSE PRACTITIONER

## 2024-08-28 PROCEDURE — G8427 DOCREV CUR MEDS BY ELIG CLIN: HCPCS | Performed by: NURSE PRACTITIONER

## 2024-08-28 PROCEDURE — 3075F SYST BP GE 130 - 139MM HG: CPT | Performed by: NURSE PRACTITIONER

## 2024-08-28 PROCEDURE — 3078F DIAST BP <80 MM HG: CPT | Performed by: NURSE PRACTITIONER

## 2024-08-28 PROCEDURE — G8417 CALC BMI ABV UP PARAM F/U: HCPCS | Performed by: NURSE PRACTITIONER

## 2024-08-28 RX ORDER — MONTELUKAST SODIUM 10 MG/1
10 TABLET ORAL DAILY
COMMUNITY
Start: 2024-06-05

## 2024-08-28 RX ORDER — NETARSUDIL 0.2 MG/ML
1 SOLUTION/ DROPS OPHTHALMIC; TOPICAL EVERY EVENING
COMMUNITY
Start: 2024-08-15

## 2024-08-28 RX ORDER — GLIPIZIDE 10 MG/1
10 TABLET, FILM COATED, EXTENDED RELEASE ORAL
COMMUNITY
Start: 2024-08-21

## 2024-08-28 ASSESSMENT — ENCOUNTER SYMPTOMS
DIARRHEA: 0
SHORTNESS OF BREATH: 0
CONSTIPATION: 0
BLOOD IN STOOL: 0
NAUSEA: 0
COUGH: 0
WHEEZING: 0
ABDOMINAL DISTENTION: 0
VOMITING: 0
CHEST TIGHTNESS: 0

## 2024-08-28 ASSESSMENT — PATIENT HEALTH QUESTIONNAIRE - PHQ9
1. LITTLE INTEREST OR PLEASURE IN DOING THINGS: NOT AT ALL
SUM OF ALL RESPONSES TO PHQ QUESTIONS 1-9: 0
SUM OF ALL RESPONSES TO PHQ9 QUESTIONS 1 & 2: 0
SUM OF ALL RESPONSES TO PHQ QUESTIONS 1-9: 0
2. FEELING DOWN, DEPRESSED OR HOPELESS: NOT AT ALL

## 2024-08-28 ASSESSMENT — ANXIETY QUESTIONNAIRES
4. TROUBLE RELAXING: NOT AT ALL
GAD7 TOTAL SCORE: 0
3. WORRYING TOO MUCH ABOUT DIFFERENT THINGS: NOT AT ALL
2. NOT BEING ABLE TO STOP OR CONTROL WORRYING: NOT AT ALL
5. BEING SO RESTLESS THAT IT IS HARD TO SIT STILL: NOT AT ALL
7. FEELING AFRAID AS IF SOMETHING AWFUL MIGHT HAPPEN: NOT AT ALL
6. BECOMING EASILY ANNOYED OR IRRITABLE: NOT AT ALL
1. FEELING NERVOUS, ANXIOUS, OR ON EDGE: NOT AT ALL

## 2024-08-28 NOTE — PROGRESS NOTES
Latricia Sandoval presents today for   Chief Complaint   Patient presents with    Follow-up     6 month       Latricia Sandoval preferred language for health care discussion is english/other.    Is someone accompanying this pt? no    Is the patient using any DME equipment during OV? no    Depression Screening:  Depression: Not at risk (8/28/2024)    PHQ-2     PHQ-2 Score: 0        Learning Assessment:  Who is the primary learner? Patient    What is the preferred language for health care of the primary learner? ENGLISH    How does the primary learner prefer to learn new concepts? DEMONSTRATION    Answered By patient    Relationship to Learner SELF           Pt currently taking Anticoagulant therapy? no    Pt currently taking Antiplatelet therapy ? no      Coordination of Care:  1. Have you been to the ER, urgent care clinic since your last visit? Hospitalized since your last visit? no    2. Have you seen or consulted any other health care providers outside of the [facility] since your last visit? Include any pap smears or colon screening. no

## 2024-12-05 ENCOUNTER — OFFICE VISIT (OUTPATIENT)
Age: 84
End: 2024-12-05
Payer: MEDICARE

## 2024-12-05 VITALS
WEIGHT: 152 LBS | OXYGEN SATURATION: 99 % | HEART RATE: 98 BPM | BODY MASS INDEX: 26.93 KG/M2 | SYSTOLIC BLOOD PRESSURE: 138 MMHG | HEIGHT: 63 IN | DIASTOLIC BLOOD PRESSURE: 66 MMHG

## 2024-12-05 DIAGNOSIS — I48.91 ATRIAL FIBRILLATION WITH CONTROLLED VENTRICULAR RATE (HCC): Primary | ICD-10-CM

## 2024-12-05 DIAGNOSIS — I10 ESSENTIAL (PRIMARY) HYPERTENSION: ICD-10-CM

## 2024-12-05 DIAGNOSIS — I25.10 ATHEROSCLEROSIS OF NATIVE CORONARY ARTERY OF NATIVE HEART WITHOUT ANGINA PECTORIS: ICD-10-CM

## 2024-12-05 PROCEDURE — 1123F ACP DISCUSS/DSCN MKR DOCD: CPT | Performed by: INTERNAL MEDICINE

## 2024-12-05 PROCEDURE — 3075F SYST BP GE 130 - 139MM HG: CPT | Performed by: INTERNAL MEDICINE

## 2024-12-05 PROCEDURE — G8400 PT W/DXA NO RESULTS DOC: HCPCS | Performed by: INTERNAL MEDICINE

## 2024-12-05 PROCEDURE — 1090F PRES/ABSN URINE INCON ASSESS: CPT | Performed by: INTERNAL MEDICINE

## 2024-12-05 PROCEDURE — G8427 DOCREV CUR MEDS BY ELIG CLIN: HCPCS | Performed by: INTERNAL MEDICINE

## 2024-12-05 PROCEDURE — 1036F TOBACCO NON-USER: CPT | Performed by: INTERNAL MEDICINE

## 2024-12-05 PROCEDURE — 3078F DIAST BP <80 MM HG: CPT | Performed by: INTERNAL MEDICINE

## 2024-12-05 PROCEDURE — 1160F RVW MEDS BY RX/DR IN RCRD: CPT | Performed by: INTERNAL MEDICINE

## 2024-12-05 PROCEDURE — G8417 CALC BMI ABV UP PARAM F/U: HCPCS | Performed by: INTERNAL MEDICINE

## 2024-12-05 PROCEDURE — 1159F MED LIST DOCD IN RCRD: CPT | Performed by: INTERNAL MEDICINE

## 2024-12-05 PROCEDURE — 99214 OFFICE O/P EST MOD 30 MIN: CPT | Performed by: INTERNAL MEDICINE

## 2024-12-05 PROCEDURE — G8484 FLU IMMUNIZE NO ADMIN: HCPCS | Performed by: INTERNAL MEDICINE

## 2024-12-05 PROCEDURE — 1126F AMNT PAIN NOTED NONE PRSNT: CPT | Performed by: INTERNAL MEDICINE

## 2024-12-05 ASSESSMENT — PATIENT HEALTH QUESTIONNAIRE - PHQ9
SUM OF ALL RESPONSES TO PHQ QUESTIONS 1-9: 0
1. LITTLE INTEREST OR PLEASURE IN DOING THINGS: NOT AT ALL
SUM OF ALL RESPONSES TO PHQ QUESTIONS 1-9: 0
2. FEELING DOWN, DEPRESSED OR HOPELESS: NOT AT ALL
SUM OF ALL RESPONSES TO PHQ9 QUESTIONS 1 & 2: 0

## 2024-12-05 NOTE — PROGRESS NOTES
Latricia Killmon presents today for   Chief Complaint   Patient presents with    Follow-up     Overdue f/u        Latricia Sandoval preferred language for health care discussion is english/other.    Is someone accompanying this pt? YES    Is the patient using any DME equipment during OV? YES    Depression Screening:  Depression: Not at risk (12/5/2024)    PHQ-2     PHQ-2 Score: 0        Learning Assessment:  Who is the primary learner? Patient    What is the preferred language for health care of the primary learner? ENGLISH    How does the primary learner prefer to learn new concepts? DEMONSTRATION    Answered By patient    Relationship to Learner SELF           Pt currently taking Anticoagulant therapy? Eiquis 5 mg BID     Pt currently taking Antiplatelet therapy ? no      Coordination of Care:  1. Have you been to the ER, urgent care clinic since your last visit? Hospitalized since your last visit? no    2. Have you seen or consulted any other health care providers outside of the [facility] since your last visit? Include any pap smears or colon screening. no    
atraumatic.   Eyes:      Pupils: Pupils are equal, round, and reactive to light.   Cardiovascular:      Rate and Rhythm: Normal rate and regular rhythm.      Heart sounds: Murmur heard.     No friction rub. No gallop.   Pulmonary:      Effort: Pulmonary effort is normal. No respiratory distress.      Breath sounds: Normal breath sounds. No wheezing or rales.   Chest:      Chest wall: No tenderness.   Abdominal:      General: Bowel sounds are normal.      Palpations: Abdomen is soft.   Musculoskeletal:         General: No tenderness.   Skin:     General: Skin is warm and dry.   Neurological:      Mental Status: She is alert and oriented to person, place, and time.       Device: WNL, rates well controlled    Impression and Plan:  Latricia Sandoval is a 82 y.o. with:    1.) CAD  2.) permanent Afib, on NOAC  3.) SSS s/p PPM  4.) CKD (RITU Pleitez)  5.) severe MR & TR  6.) PMR on steroids, known  7.) H/o CVA/ recent MRI (neuro geriatrics Dr. Jordin Hartman)  8.) HTN, above goal today    She is doing great from CV standpoint  Continue current care  RTC with me yearly with device check, carelinks and NP follow up in between - asked nurse to please coordinate for them    Thank you for allowing me to participate in the care of your patient, please do not hesitate to call with questions or concerns.    Regards,    Gina Alaniz, DO

## 2024-12-06 RX ORDER — SIMVASTATIN 20 MG
20 TABLET ORAL NIGHTLY
Qty: 90 TABLET | Refills: 3 | Status: SHIPPED | OUTPATIENT
Start: 2024-12-06

## 2024-12-06 RX ORDER — SPIRONOLACTONE 25 MG/1
25 TABLET ORAL DAILY
Qty: 90 TABLET | Refills: 3 | Status: SHIPPED | OUTPATIENT
Start: 2024-12-06

## 2025-02-17 RX ORDER — APIXABAN 5 MG/1
5 TABLET, FILM COATED ORAL 2 TIMES DAILY
Qty: 180 TABLET | Refills: 3 | Status: SHIPPED | OUTPATIENT
Start: 2025-02-17

## 2025-06-13 NOTE — PROGRESS NOTES
Latricia Sandoval presents today for No chief complaint on file.      Latricia Sandoval preferred language for health care discussion is english/other.    Is someone accompanying this pt? Yes    Is the patient using any DME equipment during OV? Yes    Depression Screening:  Depression: Not at risk (12/5/2024)    PHQ-2     PHQ-2 Score: 0        Learning Assessment:  Who is the primary learner? Patient    What is the preferred language for health care of the primary learner? ENGLISH    How does the primary learner prefer to learn new concepts? DEMONSTRATION    Answered By Patient    Relationship to Learner SELF           Pt currently taking Anticoagulant therapy? Yes, Eliquis    Pt currently taking Antiplatelet therapy ? No      Coordination of Care:  1. Have you been to the ER, urgent care clinic since your last visit? Hospitalized since your last visit? No    2. Have you seen or consulted any other health care providers outside of the [facility] since your last visit? Include any pap smears or colon screening. No

## 2025-06-13 NOTE — PROGRESS NOTES
Latricia Sandoval presents today for a 6 month check-up with device check.  She was last seen by Dr. Alaniz on 12/5/24. Her last device interrogation was done on 9/4/24.     Since her last visit, she states that she has been doing very well.  She underwent a pacemaker generator change in Jan. 2024.  She remains as physically active as she can.  She offers no cardiac complaints.  She was accompanied by a family member.     She is an 85 year old female with CAD, chronic AFIB (okay to continue NOAC per neurology), SSS (s/p permanent pacemaker, dual  change done in Jan. 2024), chronic kidney disease, mitral valve prolapse, polymyalgia rheumatica (on steroids), palpitations.  She underwent stress nuclear cardiac imaging on 4/22/19 which demonstrated normal perfusion with no evidence of scarring or ischemia. EF was estimated at 71%.     She underwent a cardiac catheterization on September 20, 1995, which demonstrated:    Patent left main trunk  Patent LAD  Minor plaquing of the distal segment of the circumflex artery on a kink with less than 30% stenosis  Patent nondominant RCA with very mild diffuse disease  Normal left ventricle    Her last echo was done on 5/5/22 and it showed an EF of 55-60%, normal wall thickness, normal wall motion, severe TR, RVSP moderately elevated at 47 mmHg, severe MR, mild aortic regurgitation.    Latricia Sandoval is a 85 y.o. female presents today for :  Chief Complaint   Patient presents with    Follow-up     6 Month        PMH:  Past Medical History:   Diagnosis Date    Benign hypertensive heart disease without heart failure     Coronary atherosclerosis of native coronary artery 09/20/95    Cath: Minor plaquing of the distal segment of the circumflex artery on a kink with less than 30% stenosis    History of echocardiogram 01/02/2008    EF 70-75%.  Mild-mod MR.  Mod TR.  PASP 40 mmHg.     History of myocardial perfusion scan 12/02/2015    Low risk.  No ischemia or prior

## 2025-06-18 ENCOUNTER — RESULTS FOLLOW-UP (OUTPATIENT)
Age: 85
End: 2025-06-18

## 2025-06-18 ENCOUNTER — OFFICE VISIT (OUTPATIENT)
Age: 85
End: 2025-06-18
Payer: MEDICARE

## 2025-06-18 ENCOUNTER — CLINICAL SUPPORT (OUTPATIENT)
Age: 85
End: 2025-06-18
Payer: MEDICARE

## 2025-06-18 VITALS
SYSTOLIC BLOOD PRESSURE: 108 MMHG | RESPIRATION RATE: 16 BRPM | HEART RATE: 90 BPM | BODY MASS INDEX: 25.69 KG/M2 | OXYGEN SATURATION: 97 % | DIASTOLIC BLOOD PRESSURE: 64 MMHG | WEIGHT: 145 LBS

## 2025-06-18 DIAGNOSIS — I10 ESSENTIAL (PRIMARY) HYPERTENSION: ICD-10-CM

## 2025-06-18 DIAGNOSIS — I49.5 TACHYCARDIA-BRADYCARDIA SYNDROME (HCC): ICD-10-CM

## 2025-06-18 DIAGNOSIS — Z95.0 PRESENCE OF CARDIAC PACEMAKER: Primary | ICD-10-CM

## 2025-06-18 DIAGNOSIS — E78.5 HYPERLIPIDEMIA, UNSPECIFIED HYPERLIPIDEMIA TYPE: ICD-10-CM

## 2025-06-18 DIAGNOSIS — Z95.0 PRESENCE OF CARDIAC PACEMAKER: ICD-10-CM

## 2025-06-18 DIAGNOSIS — I25.10 ATHEROSCLEROSIS OF NATIVE CORONARY ARTERY OF NATIVE HEART WITHOUT ANGINA PECTORIS: Primary | ICD-10-CM

## 2025-06-18 PROCEDURE — 1090F PRES/ABSN URINE INCON ASSESS: CPT | Performed by: NURSE PRACTITIONER

## 2025-06-18 PROCEDURE — 1160F RVW MEDS BY RX/DR IN RCRD: CPT | Performed by: NURSE PRACTITIONER

## 2025-06-18 PROCEDURE — 1123F ACP DISCUSS/DSCN MKR DOCD: CPT | Performed by: NURSE PRACTITIONER

## 2025-06-18 PROCEDURE — 99213 OFFICE O/P EST LOW 20 MIN: CPT | Performed by: NURSE PRACTITIONER

## 2025-06-18 PROCEDURE — 93280 PM DEVICE PROGR EVAL DUAL: CPT | Performed by: INTERNAL MEDICINE

## 2025-06-18 PROCEDURE — G8417 CALC BMI ABV UP PARAM F/U: HCPCS | Performed by: NURSE PRACTITIONER

## 2025-06-18 PROCEDURE — G8427 DOCREV CUR MEDS BY ELIG CLIN: HCPCS | Performed by: NURSE PRACTITIONER

## 2025-06-18 PROCEDURE — 1159F MED LIST DOCD IN RCRD: CPT | Performed by: NURSE PRACTITIONER

## 2025-06-18 PROCEDURE — 1036F TOBACCO NON-USER: CPT | Performed by: NURSE PRACTITIONER

## 2025-06-18 PROCEDURE — 1126F AMNT PAIN NOTED NONE PRSNT: CPT | Performed by: NURSE PRACTITIONER

## 2025-06-18 RX ORDER — MEMANTINE HYDROCHLORIDE 10 MG/1
10 TABLET ORAL 2 TIMES DAILY
COMMUNITY